# Patient Record
Sex: FEMALE | Race: BLACK OR AFRICAN AMERICAN | NOT HISPANIC OR LATINO | Employment: FULL TIME | URBAN - METROPOLITAN AREA
[De-identification: names, ages, dates, MRNs, and addresses within clinical notes are randomized per-mention and may not be internally consistent; named-entity substitution may affect disease eponyms.]

---

## 2023-10-24 ENCOUNTER — HOSPITAL ENCOUNTER (INPATIENT)
Facility: HOSPITAL | Age: 38
LOS: 2 days | Discharge: HOME | DRG: 638 | End: 2023-10-26
Attending: HOSPITALIST | Admitting: HOSPITALIST
Payer: COMMERCIAL

## 2023-10-24 DIAGNOSIS — E66.9 OBESITY, UNSPECIFIED CLASSIFICATION, UNSPECIFIED OBESITY TYPE, UNSPECIFIED WHETHER SERIOUS COMORBIDITY PRESENT: ICD-10-CM

## 2023-10-24 DIAGNOSIS — E11.40 TYPE 2 DIABETES MELLITUS WITH DIABETIC NEUROPATHY, WITHOUT LONG-TERM CURRENT USE OF INSULIN: ICD-10-CM

## 2023-10-24 DIAGNOSIS — R00.0 TACHYCARDIA: ICD-10-CM

## 2023-10-24 DIAGNOSIS — L03.119 CELLULITIS OF FOOT: ICD-10-CM

## 2023-10-24 DIAGNOSIS — N17.9 ACUTE RENAL FAILURE, UNSPECIFIED ACUTE RENAL FAILURE TYPE: ICD-10-CM

## 2023-10-24 DIAGNOSIS — I10 HYPERTENSION, UNSPECIFIED TYPE: ICD-10-CM

## 2023-10-24 DIAGNOSIS — Z79.1 NSAID LONG-TERM USE: ICD-10-CM

## 2023-10-24 DIAGNOSIS — L08.9 INFECTED BLISTER OF LEFT FOOT, INITIAL ENCOUNTER: ICD-10-CM

## 2023-10-24 DIAGNOSIS — E11.621 TYPE 2 DIABETES MELLITUS WITH FOOT ULCER, WITHOUT LONG-TERM CURRENT USE OF INSULIN: ICD-10-CM

## 2023-10-24 DIAGNOSIS — N17.9 AKI (ACUTE KIDNEY INJURY): Primary | ICD-10-CM

## 2023-10-24 DIAGNOSIS — L97.509 TYPE 2 DIABETES MELLITUS WITH FOOT ULCER, WITHOUT LONG-TERM CURRENT USE OF INSULIN: ICD-10-CM

## 2023-10-24 DIAGNOSIS — S90.822A INFECTED BLISTER OF LEFT FOOT, INITIAL ENCOUNTER: ICD-10-CM

## 2023-10-24 PROBLEM — E87.5 HYPERKALEMIA: Status: ACTIVE | Noted: 2023-10-24

## 2023-10-24 PROBLEM — D64.9 ANEMIA: Status: ACTIVE | Noted: 2023-10-24

## 2023-10-24 PROBLEM — E66.01 SEVERE OBESITY: Status: ACTIVE | Noted: 2023-10-24

## 2023-10-24 PROBLEM — L03.116 CELLULITIS OF LEFT FOOT: Status: ACTIVE | Noted: 2023-10-24

## 2023-10-24 LAB
ABO + RH BLD: NORMAL
ALBUMIN SERPL BCP-MCNC: 2.5 G/DL (ref 3.5–5.2)
ALP SERPL-CCNC: 114 U/L (ref 55–135)
ALT SERPL W/O P-5'-P-CCNC: 12 U/L (ref 10–44)
ANION GAP SERPL CALC-SCNC: 10 MMOL/L (ref 8–16)
ANION GAP SERPL CALC-SCNC: 10 MMOL/L (ref 8–16)
AST SERPL-CCNC: 12 U/L (ref 10–40)
BASOPHILS # BLD AUTO: 0.06 K/UL (ref 0–0.2)
BASOPHILS NFR BLD: 0.4 % (ref 0–1.9)
BILIRUB SERPL-MCNC: 0.2 MG/DL (ref 0.1–1)
BLD GP AB SCN CELLS X3 SERPL QL: NORMAL
BLD PROD TYP BPU: NORMAL
BLOOD UNIT EXPIRATION DATE: NORMAL
BLOOD UNIT TYPE CODE: 5100
BLOOD UNIT TYPE: NORMAL
BUN SERPL-MCNC: 53 MG/DL (ref 6–20)
BUN SERPL-MCNC: 54 MG/DL (ref 6–20)
CALCIUM SERPL-MCNC: 7.5 MG/DL (ref 8.7–10.5)
CALCIUM SERPL-MCNC: 7.6 MG/DL (ref 8.7–10.5)
CHLORIDE SERPL-SCNC: 113 MMOL/L (ref 95–110)
CHLORIDE SERPL-SCNC: 116 MMOL/L (ref 95–110)
CO2 SERPL-SCNC: 12 MMOL/L (ref 23–29)
CO2 SERPL-SCNC: 13 MMOL/L (ref 23–29)
CODING SYSTEM: NORMAL
CREAT SERPL-MCNC: 4.1 MG/DL (ref 0.5–1.4)
CREAT SERPL-MCNC: 4.2 MG/DL (ref 0.5–1.4)
CREAT UR-MCNC: 55.1 MG/DL (ref 15–325)
CROSSMATCH INTERPRETATION: NORMAL
CRP SERPL-MCNC: 103.7 MG/L (ref 0–8.2)
DIFFERENTIAL METHOD: ABNORMAL
DISPENSE STATUS: NORMAL
EOSINOPHIL # BLD AUTO: 0 K/UL (ref 0–0.5)
EOSINOPHIL NFR BLD: 0.3 % (ref 0–8)
ERYTHROCYTE [DISTWIDTH] IN BLOOD BY AUTOMATED COUNT: 13.3 % (ref 11.5–14.5)
ERYTHROCYTE [SEDIMENTATION RATE] IN BLOOD BY WESTERGREN METHOD: 142 MM/HR (ref 0–20)
EST. GFR  (NO RACE VARIABLE): 13 ML/MIN/1.73 M^2
EST. GFR  (NO RACE VARIABLE): 13 ML/MIN/1.73 M^2
FERRITIN SERPL-MCNC: 174 NG/ML (ref 20–300)
GLUCOSE SERPL-MCNC: 145 MG/DL (ref 70–110)
GLUCOSE SERPL-MCNC: 233 MG/DL (ref 70–110)
HCT VFR BLD AUTO: 21.4 % (ref 37–48.5)
HGB BLD-MCNC: 6.7 G/DL (ref 12–16)
IMM GRANULOCYTES # BLD AUTO: 0.1 K/UL (ref 0–0.04)
IMM GRANULOCYTES NFR BLD AUTO: 0.7 % (ref 0–0.5)
IRON SERPL-MCNC: 23 UG/DL (ref 30–160)
LYMPHOCYTES # BLD AUTO: 2 K/UL (ref 1–4.8)
LYMPHOCYTES NFR BLD: 13.5 % (ref 18–48)
MCH RBC QN AUTO: 27.2 PG (ref 27–31)
MCHC RBC AUTO-ENTMCNC: 31.3 G/DL (ref 32–36)
MCV RBC AUTO: 87 FL (ref 82–98)
MONOCYTES # BLD AUTO: 0.8 K/UL (ref 0.3–1)
MONOCYTES NFR BLD: 5.4 % (ref 4–15)
NEUTROPHILS # BLD AUTO: 12 K/UL (ref 1.8–7.7)
NEUTROPHILS NFR BLD: 79.7 % (ref 38–73)
NRBC BLD-RTO: 0 /100 WBC
NUM UNITS TRANS PACKED RBC: NORMAL
PLATELET # BLD AUTO: 258 K/UL (ref 150–450)
PMV BLD AUTO: 8.5 FL (ref 9.2–12.9)
POCT GLUCOSE: 132 MG/DL (ref 70–110)
POCT GLUCOSE: 213 MG/DL (ref 70–110)
POCT GLUCOSE: 243 MG/DL (ref 70–110)
POTASSIUM SERPL-SCNC: 4.7 MMOL/L (ref 3.5–5.1)
POTASSIUM SERPL-SCNC: 6.2 MMOL/L (ref 3.5–5.1)
PROT SERPL-MCNC: 7.2 G/DL (ref 6–8.4)
RBC # BLD AUTO: 2.46 M/UL (ref 4–5.4)
SATURATED IRON: 11 % (ref 20–50)
SODIUM SERPL-SCNC: 135 MMOL/L (ref 136–145)
SODIUM SERPL-SCNC: 139 MMOL/L (ref 136–145)
SODIUM UR-SCNC: 87 MMOL/L (ref 20–250)
SPECIMEN OUTDATE: NORMAL
TOTAL IRON BINDING CAPACITY: 218 UG/DL (ref 250–450)
TRANSFERRIN SERPL-MCNC: 147 MG/DL (ref 200–375)
VANCOMYCIN SERPL-MCNC: <1.1 UG/ML
WBC # BLD AUTO: 15.06 K/UL (ref 3.9–12.7)

## 2023-10-24 PROCEDURE — 99252 PR INITIAL INPATIENT CONSULT,LEVL II: ICD-10-PCS | Mod: ,,, | Performed by: PODIATRIST

## 2023-10-24 PROCEDURE — 82728 ASSAY OF FERRITIN: CPT | Performed by: FAMILY MEDICINE

## 2023-10-24 PROCEDURE — A4217 STERILE WATER/SALINE, 500 ML: HCPCS | Performed by: FAMILY MEDICINE

## 2023-10-24 PROCEDURE — 80053 COMPREHEN METABOLIC PANEL: CPT | Performed by: FAMILY MEDICINE

## 2023-10-24 PROCEDURE — 93005 ELECTROCARDIOGRAM TRACING: CPT

## 2023-10-24 PROCEDURE — 36415 COLL VENOUS BLD VENIPUNCTURE: CPT | Performed by: INTERNAL MEDICINE

## 2023-10-24 PROCEDURE — 11000001 HC ACUTE MED/SURG PRIVATE ROOM

## 2023-10-24 PROCEDURE — 36430 TRANSFUSION BLD/BLD COMPNT: CPT

## 2023-10-24 PROCEDURE — 86140 C-REACTIVE PROTEIN: CPT | Performed by: FAMILY MEDICINE

## 2023-10-24 PROCEDURE — 99255 PR INITIAL INPATIENT CONSULT,LEVL V: ICD-10-PCS | Mod: ,,, | Performed by: INTERNAL MEDICINE

## 2023-10-24 PROCEDURE — P9016 RBC LEUKOCYTES REDUCED: HCPCS | Performed by: FAMILY MEDICINE

## 2023-10-24 PROCEDURE — 83540 ASSAY OF IRON: CPT | Performed by: FAMILY MEDICINE

## 2023-10-24 PROCEDURE — 27000207 HC ISOLATION

## 2023-10-24 PROCEDURE — 84466 ASSAY OF TRANSFERRIN: CPT | Performed by: FAMILY MEDICINE

## 2023-10-24 PROCEDURE — 80048 BASIC METABOLIC PNL TOTAL CA: CPT | Mod: XB | Performed by: INTERNAL MEDICINE

## 2023-10-24 PROCEDURE — 80202 ASSAY OF VANCOMYCIN: CPT | Performed by: FAMILY MEDICINE

## 2023-10-24 PROCEDURE — 99255 IP/OBS CONSLTJ NEW/EST HI 80: CPT | Mod: ,,, | Performed by: INTERNAL MEDICINE

## 2023-10-24 PROCEDURE — 87070 CULTURE OTHR SPECIMN AEROBIC: CPT | Performed by: PODIATRIST

## 2023-10-24 PROCEDURE — 93010 ELECTROCARDIOGRAM REPORT: CPT | Mod: ,,, | Performed by: INTERNAL MEDICINE

## 2023-10-24 PROCEDURE — 84300 ASSAY OF URINE SODIUM: CPT | Performed by: FAMILY MEDICINE

## 2023-10-24 PROCEDURE — 87147 CULTURE TYPE IMMUNOLOGIC: CPT | Performed by: PODIATRIST

## 2023-10-24 PROCEDURE — 99252 IP/OBS CONSLTJ NEW/EST SF 35: CPT | Mod: ,,, | Performed by: PODIATRIST

## 2023-10-24 PROCEDURE — 86920 COMPATIBILITY TEST SPIN: CPT | Performed by: FAMILY MEDICINE

## 2023-10-24 PROCEDURE — 82570 ASSAY OF URINE CREATININE: CPT | Performed by: FAMILY MEDICINE

## 2023-10-24 PROCEDURE — 25000003 PHARM REV CODE 250: Performed by: FAMILY MEDICINE

## 2023-10-24 PROCEDURE — 85651 RBC SED RATE NONAUTOMATED: CPT | Performed by: FAMILY MEDICINE

## 2023-10-24 PROCEDURE — 63600175 PHARM REV CODE 636 W HCPCS: Performed by: FAMILY MEDICINE

## 2023-10-24 PROCEDURE — 86850 RBC ANTIBODY SCREEN: CPT | Performed by: FAMILY MEDICINE

## 2023-10-24 PROCEDURE — 85025 COMPLETE CBC W/AUTO DIFF WBC: CPT | Performed by: FAMILY MEDICINE

## 2023-10-24 PROCEDURE — 93010 EKG 12-LEAD: ICD-10-PCS | Mod: ,,, | Performed by: INTERNAL MEDICINE

## 2023-10-24 PROCEDURE — 36415 COLL VENOUS BLD VENIPUNCTURE: CPT | Performed by: FAMILY MEDICINE

## 2023-10-24 RX ORDER — HYDRALAZINE HYDROCHLORIDE 20 MG/ML
10 INJECTION INTRAMUSCULAR; INTRAVENOUS EVERY 6 HOURS PRN
Status: DISCONTINUED | OUTPATIENT
Start: 2023-10-24 | End: 2023-10-26 | Stop reason: HOSPADM

## 2023-10-24 RX ORDER — METOPROLOL TARTRATE 50 MG/1
50 TABLET ORAL 2 TIMES DAILY
Status: DISCONTINUED | OUTPATIENT
Start: 2023-10-24 | End: 2023-10-26 | Stop reason: HOSPADM

## 2023-10-24 RX ORDER — METRONIDAZOLE 500 MG/1
500 TABLET ORAL EVERY 8 HOURS
Status: DISCONTINUED | OUTPATIENT
Start: 2023-10-24 | End: 2023-10-25

## 2023-10-24 RX ORDER — SODIUM CHLORIDE 9 MG/ML
INJECTION, SOLUTION INTRAVENOUS CONTINUOUS
Status: DISCONTINUED | OUTPATIENT
Start: 2023-10-24 | End: 2023-10-24

## 2023-10-24 RX ORDER — AMLODIPINE BESYLATE 5 MG/1
5 TABLET ORAL DAILY
Status: DISCONTINUED | OUTPATIENT
Start: 2023-10-24 | End: 2023-10-26 | Stop reason: HOSPADM

## 2023-10-24 RX ORDER — METOPROLOL TARTRATE 1 MG/ML
5 INJECTION, SOLUTION INTRAVENOUS ONCE
Status: COMPLETED | OUTPATIENT
Start: 2023-10-24 | End: 2023-10-24

## 2023-10-24 RX ORDER — HYDROCODONE BITARTRATE AND ACETAMINOPHEN 7.5; 325 MG/1; MG/1
1 TABLET ORAL EVERY 6 HOURS PRN
Status: DISCONTINUED | OUTPATIENT
Start: 2023-10-24 | End: 2023-10-26 | Stop reason: HOSPADM

## 2023-10-24 RX ORDER — HYDROCODONE BITARTRATE AND ACETAMINOPHEN 500; 5 MG/1; MG/1
TABLET ORAL
Status: DISCONTINUED | OUTPATIENT
Start: 2023-10-24 | End: 2023-10-26 | Stop reason: HOSPADM

## 2023-10-24 RX ORDER — GLUCAGON 1 MG
1 KIT INJECTION
Status: DISCONTINUED | OUTPATIENT
Start: 2023-10-24 | End: 2023-10-26 | Stop reason: HOSPADM

## 2023-10-24 RX ORDER — INSULIN ASPART 100 [IU]/ML
0-5 INJECTION, SOLUTION INTRAVENOUS; SUBCUTANEOUS EVERY 6 HOURS PRN
Status: DISCONTINUED | OUTPATIENT
Start: 2023-10-24 | End: 2023-10-26 | Stop reason: HOSPADM

## 2023-10-24 RX ADMIN — AMLODIPINE BESYLATE 5 MG: 5 TABLET ORAL at 01:10

## 2023-10-24 RX ADMIN — INSULIN ASPART 2 UNITS: 100 INJECTION, SOLUTION INTRAVENOUS; SUBCUTANEOUS at 04:10

## 2023-10-24 RX ADMIN — SODIUM BICARBONATE: 84 INJECTION, SOLUTION INTRAVENOUS at 06:10

## 2023-10-24 RX ADMIN — SODIUM ZIRCONIUM CYCLOSILICATE 10 G: 5 POWDER, FOR SUSPENSION ORAL at 09:10

## 2023-10-24 RX ADMIN — METRONIDAZOLE 500 MG: 500 TABLET ORAL at 01:10

## 2023-10-24 RX ADMIN — INSULIN ASPART 1 UNITS: 100 INJECTION, SOLUTION INTRAVENOUS; SUBCUTANEOUS at 11:10

## 2023-10-24 RX ADMIN — METOPROLOL TARTRATE 50 MG: 50 TABLET, FILM COATED ORAL at 09:10

## 2023-10-24 RX ADMIN — SODIUM CHLORIDE: 9 INJECTION, SOLUTION INTRAVENOUS at 09:10

## 2023-10-24 RX ADMIN — CEFEPIME 2 G: 2 INJECTION, POWDER, FOR SOLUTION INTRAVENOUS at 09:10

## 2023-10-24 RX ADMIN — METRONIDAZOLE 500 MG: 500 TABLET ORAL at 09:10

## 2023-10-24 RX ADMIN — METOROPROLOL TARTRATE 5 MG: 5 INJECTION, SOLUTION INTRAVENOUS at 05:10

## 2023-10-24 RX ADMIN — HYDRALAZINE HYDROCHLORIDE 10 MG: 20 INJECTION, SOLUTION INTRAMUSCULAR; INTRAVENOUS at 01:10

## 2023-10-24 RX ADMIN — VANCOMYCIN HYDROCHLORIDE 1750 MG: 10 INJECTION, POWDER, LYOPHILIZED, FOR SOLUTION INTRAVENOUS at 12:10

## 2023-10-24 NOTE — ASSESSMENT & PLAN NOTE
Complicates overall care.  Discussed the importance of appropriate diabetic management and glycemic control.

## 2023-10-24 NOTE — ASSESSMENT & PLAN NOTE
Continue IV antibiotics for the left lower extremity.  Per Hospital Medicine  MRI to assess potential underlying abscess.    Culture taken of blister

## 2023-10-24 NOTE — ASSESSMENT & PLAN NOTE
Will cont vanc, cefepime, and flagyl  Podiatry consulted  Mri ordered   Npo at midnight for possible surgery tomorrow

## 2023-10-24 NOTE — CONSULTS
[facility]  Podiatry  Consult Note    Patient Name: Ian Albrecht  MRN: [id number]  Admission Date: 10/24/2023  Hospital Length of Stay: 0 days  Attending Physician: Tenzin Grayson MD  Primary Care Provider: No primary care provider on file.     Inpatient consult to Podiatry  Consult performed by: Rozina Welch DPM  Consult ordered by: Tenzin Grayson MD  Reason for consult: Cellulitis of left foot with infected blister.  Assessment/Recommendations: MRI to further evaluate for underlying abscess formation.  Culture taken blister.  Follow cultures and sensitivities.        Subjective:     History of Present Illness:  38 year old with PMHx of Diabetes Mellitus Type , hypertension, and hyperlipidemia.  Presented to the emergency department at [facility] same the with worsening cellulitis, increased pain, and swelling to the left foot for the last 3 days.  Operative shoe lower she is developing a callus to the 5th toe to ultimately resulted in a blister formation that began to spread to the dorsal lateral aspect of the left foot. Blister formation 5 cm x 4cm present to the dorsal lateral left foot and left fifth digit.  The greatest the provider in the emergency department at the outside facility was concerned for potential gangrenous changes but did note that patient did have a pulse in the foot      Scheduled Meds:   ceFEPime (MAXIPIME) IVPB  2 g Intravenous Q24H    metroNIDAZOLE  500 mg Oral Q8H     Continuous Infusions:   sodium chloride 0.9% 75 mL/hr at 10/24/23 0901     PRN Meds:0.9%  NaCl infusion (for blood administration), glucagon (human recombinant), hydrALAZINE, insulin aspart U-100, Pharmacy to dose Vancomycin consult **AND** vancomycin - pharmacy to dose    Review of patient's allergies indicates:  No Known Allergies     No past medical history on file.  No past surgical history on file.    Family History    None       Tobacco Use    Smoking status: Not on file    Smokeless  tobacco: Not on file   Substance and Sexual Activity    Alcohol use: Not on file    Drug use: Not on file    Sexual activity: Not on file     Review of Systems   Constitutional:  Positive for activity change. Negative for appetite change, chills and fever.   HENT:  Positive for nosebleeds. Negative for facial swelling and voice change.    Eyes:  Negative for visual disturbance.   Respiratory:  Negative for cough, chest tightness and shortness of breath.    Cardiovascular:  Negative for chest pain and leg swelling.   Gastrointestinal:  Positive for nausea. Negative for abdominal pain, diarrhea and vomiting.   Genitourinary:  Negative for difficulty urinating and frequency.   Musculoskeletal:  Positive for gait problem. Negative for arthralgias, back pain and joint swelling.   Skin:  Positive for color change and wound. Negative for pallor and rash.   Allergic/Immunologic: Negative for immunocompromised state.   Neurological:  Negative for weakness and numbness.   Psychiatric/Behavioral:  Negative for confusion. The patient is not nervous/anxious.      Objective:     Vital Signs (Most Recent):  Temp: 97.8 °F (36.6 °C) (10/24/23 0710)  Pulse: 102 (10/24/23 0710)  Resp: 17 (10/24/23 0710)  BP: (!) 147/86 (10/24/23 0710)  SpO2: 100 % (10/24/23 0710) Vital Signs (24h Range):  Temp:  [97.8 °F (36.6 °C)] 97.8 °F (36.6 °C)  Pulse:  [102] 102  Resp:  [17] 17  SpO2:  [100 %] 100 %  BP: (147)/(86) 147/86     Weight: 89.8 kg (198 lb)  Body mass index is 36.21 kg/m².    Foot Exam    CONSTITUTIONAL:  Patient is awake, alert, and oriented to person, place, and time.  Patient is well groomed with normal appearance.  No activity change.  No appetite change.    EYES:  Pupils are equal and reactive to light.  No icterus    ENT:  Mucous membranes are moist.  Dentition intact.    PULMONARY:  Breathing is nonlabored.  No wheezing or rales    VASCULAR:  Dorsalis pedis pulse on the right 2/4, on the left 2/4.  Posterior tibial pulse on  "the right 2/4, on the left 2/4.  Capillary refill intact less than 3 sec of bilateral lower extremities.  Pedal hair growth is absent to the dorsal aspect of the foot and digits bilaterally.  Moderate edema to lower extremities.    NEUROLOGICAL:  Sensation light touch is intact.  Sensation to pinprick is intact.  Protective sensation is reduced with 5.07 g [place]-Gutierrez monofilament    DERMATOLOGICAL:  blister formation to the dorsal aspect the left forefoot encompassing the dorsal aspect of the 5th digit.  The area does measure approximately 5 cm x 4 cm with sloughing tissue.  Cellulitis to the foot.  There is moderate edema present.  There is no visualized open wound progressing to the subcutaneous tissue.  No bone or tendinous exposure    MUSCULOSKELETAL: No history of amputations. Intrinsic and extrinsic musculature intact to the lower extremity.  5/5 muscle strength testing.    Laboratory:  A1C: No results for input(s): "HGBA1C" in the last 4320 hours.  CBC:   Recent Labs   Lab 10/24/23  0834   WBC 15.06*   RBC 2.46*   HGB 6.7*   HCT 21.4*      MCV 87   MCH 27.2   MCHC 31.3*     CMP:   Recent Labs   Lab 10/24/23  0834   *   CALCIUM 7.5*   ALBUMIN 2.5*   PROT 7.2      K 6.2*   CO2 13*   *   BUN 54*   CREATININE 4.2*   ALKPHOS 114   ALT 12   AST 12   BILITOT 0.2     CPS: {:LNK,LABRCNTIP[  CRP:   Recent Labs   Lab 10/24/23  0834   .7*     ESR:   Recent Labs   Lab 10/24/23  0834   SEDRATE 142*     Wound Cultures: No results for input(s): "LABAERO" in the last 4320 hours.  All pertinent labs reviewed within the last 24 hours.    Diagnostic Results:  I have reviewed all pertinent imaging results/findings within the past 24 hours.    Clinical Findings:  Cellulitis with superficial blister to the left lateral foot.    Assessment/Plan:     Renal/  JOSE (acute kidney injury)  GFR 13.  IV fluids her Hospital Medicine.      ID  Cellulitis of left foot  Continue IV antibiotics for the " left lower extremity.  Per Hospital Medicine  MRI to assess potential underlying abscess.    Culture taken of blister    Endocrine  Type 2 diabetes mellitus with diabetic neuropathy, without long-term current use of insulin  Complicates overall care.  Discussed the importance of appropriate diabetic management and glycemic control.    Orthopedic  Infected blister of left foot  Culture taken of the blister to the left foot.  Will await cultures and sensitivities.  MRI to determine if underlying abscess is noted.  If positive, would recommend surgical incision and drainage.  If negative, patient would likely tolerate local wound care with home health on discharge        Thank you for your consult.     Rozina Welch DPM  Podiatry  [facility] - Med Surg

## 2023-10-24 NOTE — HPI
38 year old with PMHx of Diabetes Mellitus Type , hypertension, and hyperlipidemia.  Presented to the emergency department at [facility] same the with worsening cellulitis, increased pain, and swelling to the left foot for the last 3 days.  Operative shoe lower she is developing a callus to the 5th toe to ultimately resulted in a blister formation that began to spread to the dorsal lateral aspect of the left foot. Blister formation 5 cm x 4cm present to the dorsal lateral left foot and left fifth digit.  The greatest the provider in the emergency department at the outside facility was concerned for potential gangrenous changes but did note that patient did have a pulse in the foot

## 2023-10-24 NOTE — ASSESSMENT & PLAN NOTE
Culture taken of the blister to the left foot.  Will await cultures and sensitivities.  MRI to determine if underlying abscess is noted.  If positive, would recommend surgical incision and drainage.  If negative, patient would likely tolerate local wound care with home health on discharge

## 2023-10-24 NOTE — HOSPITAL COURSE
Patient see in her room NPO with cellulitis of the left foot. WBC 15.06, Hgb 6.7, Sed 142, K+ 6.2, .7, Creatinine 4.2, GFR 13.     Xray negative for subcutaneous gas, No fracture or dislocation.     Currently admitted to hospital medicine on IV antibiotics.     10/25/2023:    WBC improving to 11.4, K +improved to 4.7, GFR stable 13    Culture of the left foot positive for Streptococcus agalactiae- preliminary    MRI completed on 10/25/2023.  No evidence of drainable abscess or fluid collection.  Cellulitis with edema present to the left foot.  No evidence of underlying bony/osseous involvement.

## 2023-10-24 NOTE — PLAN OF CARE
Pt Awake, Alert, and oriented to Person, Place, Time  , remains free from falls/injuries this shift. Safety precautions maintained. Pt ST on heart monitor 8630, MD notified that pt heart rate ranged between 125-130s. Orders placed. Pt tolerating blood transfusion. Pain managed with pain medication and rest. No s/s of acute distress noted. Continue with plan of care. Chart check complete.       Problem: Adult Inpatient Plan of Care  Goal: Plan of Care Review  Outcome: Ongoing, Progressing  Goal: Patient-Specific Goal (Individualized)  Outcome: Ongoing, Progressing  Goal: Absence of Hospital-Acquired Illness or Injury  Outcome: Ongoing, Progressing  Goal: Optimal Comfort and Wellbeing  Outcome: Ongoing, Progressing  Goal: Readiness for Transition of Care  Outcome: Ongoing, Progressing

## 2023-10-24 NOTE — ASSESSMENT & PLAN NOTE
Anemia noted  Pt reports menorrhagia which has stopped  Will transfuse 1 unit prbc   Iron studies

## 2023-10-24 NOTE — PROVIDER TRANSFER
Outside Transfer Acceptance Note / Regional Referral Center    Referring facility: [facility]   Referring provider: FAVIOLA MILLER  Accepting facility: [facility]  Accepting provider: WELLINGTON HOOVER  Admitting provider: CHARLY VERGARA  Reason for transfer: Higher Level of Care   Transfer diagnosis: cellulitis of foot w/ abscess, ARF  Transfer specialty requested: Nephrology  Transfer specialty notified: Yes  Transfer level: NUMBER 1-5: 2  Bed type requested: foot abscess and cellulitis  Isolation status: No active isolations   Admission class or status: Emergency      Narrative      Ms. Lopez is a 39yo lady with a past medical history of DM2, HTN and LHD.  She now presents to [facility] with 3 days of worsening pain, redness and swelling to her left foot.  Her issues began as a blister to her 5th toe, which then spread to half of the foot.  There is a blister there 10x8 mm.  The area appears to have a gangrenous appearance to the sending MD.  He notes decreased, but present pulses in the foot.    Latest vitals: 98.8 temp, 121 HR (was in 140s on arrival), 136/77 (was 213/133 on arrival),99% on RA    Labs showed:  WBC 15, Hg 7.8, Cr 4.4, BUN 54, Gluc 285, trop 23, NML LFT, no ESR or CRP    Xray: no fracture/no dislocation/no subq gas    In the ED she was treated with Vancomycin, Zosyn and Clindamyin    Podiatry was notified and is willing to assist in the patient's care      Objective      Recent Labs: All pertinent labs within the past 24 hours have been reviewed.      IV access:  PIV  Infusions: none  Allergies: Review of patient's allergies indicates:  Not on File   NPO: No    Anticoagulation:   Anticoagulants       None             Instructions      [facility]  Admit to Hospital Medicine  Upon patient arrival to floor, please contact Hospital Medicine on call.

## 2023-10-24 NOTE — SUBJECTIVE & OBJECTIVE
Scheduled Meds:   ceFEPime (MAXIPIME) IVPB  2 g Intravenous Q24H    metroNIDAZOLE  500 mg Oral Q8H     Continuous Infusions:   sodium chloride 0.9% 75 mL/hr at 10/24/23 0901     PRN Meds:0.9%  NaCl infusion (for blood administration), glucagon (human recombinant), hydrALAZINE, insulin aspart U-100, Pharmacy to dose Vancomycin consult **AND** vancomycin - pharmacy to dose    Review of patient's allergies indicates:  No Known Allergies     No past medical history on file.  No past surgical history on file.    Family History    None       Tobacco Use    Smoking status: Not on file    Smokeless tobacco: Not on file   Substance and Sexual Activity    Alcohol use: Not on file    Drug use: Not on file    Sexual activity: Not on file     Review of Systems   Constitutional:  Positive for activity change. Negative for appetite change, chills and fever.   HENT:  Positive for nosebleeds. Negative for facial swelling and voice change.    Eyes:  Negative for visual disturbance.   Respiratory:  Negative for cough, chest tightness and shortness of breath.    Cardiovascular:  Negative for chest pain and leg swelling.   Gastrointestinal:  Positive for nausea. Negative for abdominal pain, diarrhea and vomiting.   Genitourinary:  Negative for difficulty urinating and frequency.   Musculoskeletal:  Positive for gait problem. Negative for arthralgias, back pain and joint swelling.   Skin:  Positive for color change and wound. Negative for pallor and rash.   Allergic/Immunologic: Negative for immunocompromised state.   Neurological:  Negative for weakness and numbness.   Psychiatric/Behavioral:  Negative for confusion. The patient is not nervous/anxious.      Objective:     Vital Signs (Most Recent):  Temp: 97.8 °F (36.6 °C) (10/24/23 0710)  Pulse: 102 (10/24/23 0710)  Resp: 17 (10/24/23 0710)  BP: (!) 147/86 (10/24/23 0710)  SpO2: 100 % (10/24/23 0710) Vital Signs (24h Range):  Temp:  [97.8 °F (36.6 °C)] 97.8 °F (36.6 °C)  Pulse:  [102]  "102  Resp:  [17] 17  SpO2:  [100 %] 100 %  BP: (147)/(86) 147/86     Weight: 89.8 kg (198 lb)  Body mass index is 36.21 kg/m².    Foot Exam    CONSTITUTIONAL:  Patient is awake, alert, and oriented to person, place, and time.  Patient is well groomed with normal appearance.  No activity change.  No appetite change.    EYES:  Pupils are equal and reactive to light.  No icterus    ENT:  Mucous membranes are moist.  Dentition intact.    PULMONARY:  Breathing is nonlabored.  No wheezing or rales    VASCULAR:  Dorsalis pedis pulse on the right 2/4, on the left 2/4.  Posterior tibial pulse on the right 2/4, on the left 2/4.  Capillary refill intact less than 3 sec of bilateral lower extremities.  Pedal hair growth is absent to the dorsal aspect of the foot and digits bilaterally.  Moderate edema to lower extremities.    NEUROLOGICAL:  Sensation light touch is intact.  Sensation to pinprick is intact.  Protective sensation is reduced with 5.07 g [place]-Gutierrez monofilament    DERMATOLOGICAL:  blister formation to the dorsal aspect the left forefoot encompassing the dorsal aspect of the 5th digit.  The area does measure approximately 5 cm x 4 cm with sloughing tissue.  Cellulitis to the foot.  There is moderate edema present.  There is no visualized open wound progressing to the subcutaneous tissue.  No bone or tendinous exposure    MUSCULOSKELETAL: No history of amputations. Intrinsic and extrinsic musculature intact to the lower extremity.  5/5 muscle strength testing.    Laboratory:  A1C: No results for input(s): "HGBA1C" in the last 4320 hours.  CBC:   Recent Labs   Lab 10/24/23  0834   WBC 15.06*   RBC 2.46*   HGB 6.7*   HCT 21.4*      MCV 87   MCH 27.2   MCHC 31.3*     CMP:   Recent Labs   Lab 10/24/23  0834   *   CALCIUM 7.5*   ALBUMIN 2.5*   PROT 7.2      K 6.2*   CO2 13*   *   BUN 54*   CREATININE 4.2*   ALKPHOS 114   ALT 12   AST 12   BILITOT 0.2     CPS: {:LNK,LABRCNTIP[  CRP:   Recent " "Labs   Lab 10/24/23  0834   .7*     ESR:   Recent Labs   Lab 10/24/23  0834   SEDRATE 142*     Wound Cultures: No results for input(s): "LABAERO" in the last 4320 hours.  All pertinent labs reviewed within the last 24 hours.    Diagnostic Results:  I have reviewed all pertinent imaging results/findings within the past 24 hours.    Clinical Findings:  Cellulitis with superficial blister to the left lateral foot.  "

## 2023-10-24 NOTE — HPI
Patient is a 38 year old aa female with a PMH of DM, hypertension, and hyperlipidemia who presented to [facility] for wound on left foot. She reports having a blister formed on her foot a few days ago which progressively worsened. She reports pain however denies any fever. Does report drainage from wound. Xray performed at outlying facility negative for subcutaneous gas however there was high concern for gangrene. Patient was transferred to [facility] for podiatry services.

## 2023-10-24 NOTE — SUBJECTIVE & OBJECTIVE
No past medical history on file.    No past surgical history on file.    Review of patient's allergies indicates:  No Known Allergies    No current facility-administered medications on file prior to encounter.     No current outpatient medications on file prior to encounter.     Family History    None       Tobacco Use    Smoking status: Not on file    Smokeless tobacco: Not on file   Substance and Sexual Activity    Alcohol use: Not on file    Drug use: Not on file    Sexual activity: Not on file     Review of Systems   Constitutional:  Positive for fatigue. Negative for fever.   HENT:  Negative for sinus pressure.    Eyes:  Negative for visual disturbance.   Respiratory:  Negative for shortness of breath.    Cardiovascular:  Negative for chest pain.   Gastrointestinal:  Negative for nausea and vomiting.   Genitourinary:  Negative for difficulty urinating.   Musculoskeletal:  Negative for back pain.   Skin:  Positive for wound (left foot). Negative for rash.   Neurological:  Negative for headaches.   Psychiatric/Behavioral:  Negative for confusion.      Objective:     Vital Signs (Most Recent):  Temp: 97.8 °F (36.6 °C) (10/24/23 0710)  Pulse: 102 (10/24/23 0710)  Resp: 17 (10/24/23 0710)  BP: (!) 147/86 (10/24/23 0710)  SpO2: 100 % (10/24/23 0710) Vital Signs (24h Range):  Temp:  [97.8 °F (36.6 °C)] 97.8 °F (36.6 °C)  Pulse:  [102] 102  Resp:  [17] 17  SpO2:  [100 %] 100 %  BP: (147)/(86) 147/86     Weight: 89.8 kg (198 lb)  Body mass index is 36.21 kg/m².     Physical Exam  Constitutional:       General: She is not in acute distress.     Appearance: She is well-developed. She is not diaphoretic.   HENT:      Head: Normocephalic and atraumatic.   Eyes:      Pupils: Pupils are equal, round, and reactive to light.   Cardiovascular:      Rate and Rhythm: Normal rate and regular rhythm.      Heart sounds: Normal heart sounds. No murmur heard.     No friction rub. No gallop.   Pulmonary:      Effort: Pulmonary effort is  normal. No respiratory distress.      Breath sounds: Normal breath sounds. No stridor. No wheezing or rales.   Abdominal:      General: Bowel sounds are normal. There is no distension.      Palpations: Abdomen is soft. There is no mass.      Tenderness: There is no abdominal tenderness. There is no guarding.   Skin:     General: Skin is warm.      Findings: No erythema.      Comments: Blister noted on lateral left foot   Neurological:      Mental Status: She is alert and oriented to person, place, and time.              CRANIAL NERVES     CN III, IV, VI   Pupils are equal, round, and reactive to light.       Significant Labs:   Results for orders placed or performed during the hospital encounter of 10/24/23   CBC Auto Differential   Result Value Ref Range    WBC 15.06 (H) 3.90 - 12.70 K/uL    RBC 2.46 (L) 4.00 - 5.40 M/uL    Hemoglobin 6.7 (L) 12.0 - 16.0 g/dL    Hematocrit 21.4 (L) 37.0 - 48.5 %    MCV 87 82 - 98 fL    MCH 27.2 27.0 - 31.0 pg    MCHC 31.3 (L) 32.0 - 36.0 g/dL    RDW 13.3 11.5 - 14.5 %    Platelets 258 150 - 450 K/uL    MPV 8.5 (L) 9.2 - 12.9 fL    Immature Granulocytes 0.7 (H) 0.0 - 0.5 %    Gran # (ANC) 12.0 (H) 1.8 - 7.7 K/uL    Immature Grans (Abs) 0.10 (H) 0.00 - 0.04 K/uL    Lymph # 2.0 1.0 - 4.8 K/uL    Mono # 0.8 0.3 - 1.0 K/uL    Eos # 0.0 0.0 - 0.5 K/uL    Baso # 0.06 0.00 - 0.20 K/uL    nRBC 0 0 /100 WBC    Gran % 79.7 (H) 38.0 - 73.0 %    Lymph % 13.5 (L) 18.0 - 48.0 %    Mono % 5.4 4.0 - 15.0 %    Eosinophil % 0.3 0.0 - 8.0 %    Basophil % 0.4 0.0 - 1.9 %    Differential Method Automated    Comprehensive metabolic panel   Result Value Ref Range    Sodium 139 136 - 145 mmol/L    Potassium 6.2 (H) 3.5 - 5.1 mmol/L    Chloride 116 (H) 95 - 110 mmol/L    CO2 13 (L) 23 - 29 mmol/L    Glucose 145 (H) 70 - 110 mg/dL    BUN 54 (H) 6 - 20 mg/dL    Creatinine 4.2 (H) 0.5 - 1.4 mg/dL    Calcium 7.5 (L) 8.7 - 10.5 mg/dL    Total Protein 7.2 6.0 - 8.4 g/dL    Albumin 2.5 (L) 3.5 - 5.2 g/dL    Total  Bilirubin 0.2 0.1 - 1.0 mg/dL    Alkaline Phosphatase 114 55 - 135 U/L    AST 12 10 - 40 U/L    ALT 12 10 - 44 U/L    eGFR 13 (A) >60 mL/min/1.73 m^2    Anion Gap 10 8 - 16 mmol/L   Sedimentation rate   Result Value Ref Range    Sed Rate 142 (H) 0 - 20 mm/Hr   C-reactive protein   Result Value Ref Range    .7 (H) 0.0 - 8.2 mg/L   Ferritin   Result Value Ref Range    Ferritin 174 20.0 - 300.0 ng/mL   Vancomycin, random   Result Value Ref Range    Vancomycin, Random <1.1 Not established ug/mL   Type & Screen   Result Value Ref Range    Group & Rh O POS     Indirect Jorge NEG     Specimen Outdate 10/27/2023 23:59    POCT glucose   Result Value Ref Range    POCT Glucose 132 (H) 70 - 110 mg/dL        Significant Imaging: I have reviewed all pertinent imaging results/findings within the past 24 hours.

## 2023-10-24 NOTE — H&P
[facility]  History & Physical    Patient Name: Ian Albrecht  MRN: [id number]  Patient Class: IP- Inpatient  Admission Date: 10/24/2023  Attending Physician: Tenzin Grayson MD  Primary Care Provider: No primary care provider on file.         Patient information was obtained from patient and ER records.     Subjective:     Principal Problem:Infected blister of left foot    Chief Complaint:   Chief Complaint   Patient presents with    left foot wound        HPI: Patient is a 38 year old aa female with a PMH of DM, hypertension, and hyperlipidemia who presented to [facility] for wound on left foot. She reports having a blister formed on her foot a few days ago which progressively worsened. She reports pain however denies any fever. Does report drainage from wound. Xray performed at outlying facility negative for subcutaneous gas however there was high concern for gangrene. Patient was transferred to [facility] for podiatry services.           No past medical history on file.    No past surgical history on file.    Review of patient's allergies indicates:  No Known Allergies    No current facility-administered medications on file prior to encounter.     No current outpatient medications on file prior to encounter.     Family History    None       Tobacco Use    Smoking status: Not on file    Smokeless tobacco: Not on file   Substance and Sexual Activity    Alcohol use: Not on file    Drug use: Not on file    Sexual activity: Not on file     Review of Systems   Constitutional:  Positive for fatigue. Negative for fever.   HENT:  Negative for sinus pressure.    Eyes:  Negative for visual disturbance.   Respiratory:  Negative for shortness of breath.    Cardiovascular:  Negative for chest pain.   Gastrointestinal:  Negative for nausea and vomiting.   Genitourinary:  Negative for difficulty urinating.   Musculoskeletal:  Negative for back pain.   Skin:  Positive for wound  (left foot). Negative for rash.   Neurological:  Negative for headaches.   Psychiatric/Behavioral:  Negative for confusion.      Objective:     Vital Signs (Most Recent):  Temp: 97.8 °F (36.6 °C) (10/24/23 0710)  Pulse: 102 (10/24/23 0710)  Resp: 17 (10/24/23 0710)  BP: (!) 147/86 (10/24/23 0710)  SpO2: 100 % (10/24/23 0710) Vital Signs (24h Range):  Temp:  [97.8 °F (36.6 °C)] 97.8 °F (36.6 °C)  Pulse:  [102] 102  Resp:  [17] 17  SpO2:  [100 %] 100 %  BP: (147)/(86) 147/86     Weight: 89.8 kg (198 lb)  Body mass index is 36.21 kg/m².     Physical Exam  Constitutional:       General: She is not in acute distress.     Appearance: She is well-developed. She is not diaphoretic.   HENT:      Head: Normocephalic and atraumatic.   Eyes:      Pupils: Pupils are equal, round, and reactive to light.   Cardiovascular:      Rate and Rhythm: Normal rate and regular rhythm.      Heart sounds: Normal heart sounds. No murmur heard.     No friction rub. No gallop.   Pulmonary:      Effort: Pulmonary effort is normal. No respiratory distress.      Breath sounds: Normal breath sounds. No stridor. No wheezing or rales.   Abdominal:      General: Bowel sounds are normal. There is no distension.      Palpations: Abdomen is soft. There is no mass.      Tenderness: There is no abdominal tenderness. There is no guarding.   Skin:     General: Skin is warm.      Findings: No erythema.      Comments: Blister noted on lateral left foot   Neurological:      Mental Status: She is alert and oriented to person, place, and time.              CRANIAL NERVES     CN III, IV, VI   Pupils are equal, round, and reactive to light.       Significant Labs:   Results for orders placed or performed during the hospital encounter of 10/24/23   CBC Auto Differential   Result Value Ref Range    WBC 15.06 (H) 3.90 - 12.70 K/uL    RBC 2.46 (L) 4.00 - 5.40 M/uL    Hemoglobin 6.7 (L) 12.0 - 16.0 g/dL    Hematocrit 21.4 (L) 37.0 - 48.5 %    MCV 87 82 - 98 fL    MCH  27.2 27.0 - 31.0 pg    MCHC 31.3 (L) 32.0 - 36.0 g/dL    RDW 13.3 11.5 - 14.5 %    Platelets 258 150 - 450 K/uL    MPV 8.5 (L) 9.2 - 12.9 fL    Immature Granulocytes 0.7 (H) 0.0 - 0.5 %    Gran # (ANC) 12.0 (H) 1.8 - 7.7 K/uL    Immature Grans (Abs) 0.10 (H) 0.00 - 0.04 K/uL    Lymph # 2.0 1.0 - 4.8 K/uL    Mono # 0.8 0.3 - 1.0 K/uL    Eos # 0.0 0.0 - 0.5 K/uL    Baso # 0.06 0.00 - 0.20 K/uL    nRBC 0 0 /100 WBC    Gran % 79.7 (H) 38.0 - 73.0 %    Lymph % 13.5 (L) 18.0 - 48.0 %    Mono % 5.4 4.0 - 15.0 %    Eosinophil % 0.3 0.0 - 8.0 %    Basophil % 0.4 0.0 - 1.9 %    Differential Method Automated    Comprehensive metabolic panel   Result Value Ref Range    Sodium 139 136 - 145 mmol/L    Potassium 6.2 (H) 3.5 - 5.1 mmol/L    Chloride 116 (H) 95 - 110 mmol/L    CO2 13 (L) 23 - 29 mmol/L    Glucose 145 (H) 70 - 110 mg/dL    BUN 54 (H) 6 - 20 mg/dL    Creatinine 4.2 (H) 0.5 - 1.4 mg/dL    Calcium 7.5 (L) 8.7 - 10.5 mg/dL    Total Protein 7.2 6.0 - 8.4 g/dL    Albumin 2.5 (L) 3.5 - 5.2 g/dL    Total Bilirubin 0.2 0.1 - 1.0 mg/dL    Alkaline Phosphatase 114 55 - 135 U/L    AST 12 10 - 40 U/L    ALT 12 10 - 44 U/L    eGFR 13 (A) >60 mL/min/1.73 m^2    Anion Gap 10 8 - 16 mmol/L   Sedimentation rate   Result Value Ref Range    Sed Rate 142 (H) 0 - 20 mm/Hr   C-reactive protein   Result Value Ref Range    .7 (H) 0.0 - 8.2 mg/L   Ferritin   Result Value Ref Range    Ferritin 174 20.0 - 300.0 ng/mL   Vancomycin, random   Result Value Ref Range    Vancomycin, Random <1.1 Not established ug/mL   Type & Screen   Result Value Ref Range    Group & Rh O POS     Indirect Jorge NEG     Specimen Outdate 10/27/2023 23:59    POCT glucose   Result Value Ref Range    POCT Glucose 132 (H) 70 - 110 mg/dL        Significant Imaging: I have reviewed all pertinent imaging results/findings within the past 24 hours.    Assessment/Plan:     * Infected blister of left foot  Will cont vanc, cefepime, and flagyl  Podiatry consulted  Mri  ordered   Npo at midnight for possible surgery tomorrow      Hyperkalemia  lokelma  IVF  Repeat bmp later today       Anemia  Anemia noted  Pt reports menorrhagia which has stopped  Will transfuse 1 unit prbc   Iron studies       JOSE (acute kidney injury)  Uncertain on baseline creatinine  Check urine lytes  Consult nephrology on case  IVF     Type 2 diabetes mellitus with diabetic neuropathy, without long-term current use of insulin  Sliding scale  Ada diet       VTE Risk Mitigation (From admission, onward)    None                         Tenzin Grayson MD  Department of Hospital Medicine  [facility]

## 2023-10-24 NOTE — PROGRESS NOTES
Pharmacokinetic Initial Assessment: IV Vancomycin    Assessment/Plan:    Initiate intravenous vancomycin with loading dose of 1750 mg once with subsequent doses when random concentrations are less than 20 mcg/mL  Desired empiric serum trough concentration is 10 to 20 mcg/mL  Draw vancomycin random level on 10/25 at 1100.  Pharmacy will continue to follow and monitor vancomycin.      Please contact pharmacy at extension [phone] with any questions regarding this assessment.     Thank you for the consult,   Donna Timmons [facility]       Patient brief summary:  Ian Albrecht is a 38 y.o. female initiated on antimicrobial therapy with IV Vancomycin for treatment of suspected skin & soft tissue infection with high concern for gangrene    Drug Allergies:   Review of patient's allergies indicates:  No Known Allergies    Actual Body Weight:   89.8 kg    Renal Function:   Estimated Creatinine Clearance: 18.6 mL/min (A) (based on SCr of 4.2 mg/dL (H)).,     Dialysis Method (if applicable):  N/A-JOSE uncertain on baseline/nephrology consulted    CBC (last 72 hours):  Recent Labs   Lab Result Units 10/24/23  0834   WBC K/uL 15.06*   Hemoglobin g/dL 6.7*   Hematocrit % 21.4*   Platelets K/uL 258   Gran % % 79.7*   Lymph % % 13.5*   Mono % % 5.4   Eosinophil % % 0.3   Basophil % % 0.4   Differential Method  Automated       Metabolic Panel (last 72 hours):  Recent Labs   Lab Result Units 10/24/23  0834   Sodium mmol/L 139   Potassium mmol/L 6.2*   Chloride mmol/L 116*   CO2 mmol/L 13*   Glucose mg/dL 145*   BUN mg/dL 54*   Creatinine mg/dL 4.2*   Albumin g/dL 2.5*   Total Bilirubin mg/dL 0.2   Alkaline Phosphatase U/L 114   AST U/L 12   ALT U/L 12       Drug levels (last 3 results):  Recent Labs   Lab Result Units 10/24/23  0957   Vancomycin, Random ug/mL <1.1       Microbiologic Results:  Microbiology Results (last 7 days)       Procedure Component Value Units Date/Time    Aerobic culture [[id number]] Collected: 10/24/23  1025    Order Status: Sent Specimen: Wound from Foot, Left Updated: 10/24/23 1038

## 2023-10-24 NOTE — PLAN OF CARE
[facility]  Initial Discharge Assessment       Primary Care Provider: Maria Guadalupe Bui    Admission Diagnosis: Cellulitis of foot [L03.119]  Cellulitis of foot [L03.119]    Admission Date: 10/24/2023  Expected Discharge Date: per attending         Payor: BLUE CROSS [facility] / Plan: [facility] / Product Type: Commercial /     Extended Emergency Contact Information  Primary Emergency Contact: Marie Simmons  Mobile Phone: [phone]  Relation: Mother   needed? No    Discharge Plan A: Home with family       No Pharmacies Listed    Initial Assessment (most recent)       Adult Discharge Assessment - 10/24/23 1304          Discharge Assessment    Assessment Type Discharge Planning Assessment     Confirmed/corrected address, phone number and insurance Yes     Confirmed Demographics Correct on Facesheet     Source of Information patient     When was your last doctors appointment? --   [facility]    Communicated VENKAT with patient/caregiver Date not available/Unable to determine     Reason For Admission cellulitus of foot     People in Home child(raven), dependent     Do you expect to return to your current living situation? Yes     Do you have help at home or someone to help you manage your care at home? No     Prior to hospitilization cognitive status: Alert/Oriented     Current cognitive status: Alert/Oriented     Equipment Currently Used at Home none     Readmission within 30 days? No     Patient currently being followed by outpatient case management? No     Do you currently have service(s) that help you manage your care at home? No     Do you take prescription medications? Yes     Do you have prescription coverage? Yes     Coverage bcbs ms     Do you have any problems affording any of your prescribed medications? No     Who is going to help you get home at discharge? relative     How do you get to doctors appointments? car, drives self     Are you on dialysis? No     Do you  take coumadin? No     Discharge Plan A Home with family

## 2023-10-24 NOTE — CONSULTS
[facility]  Nephrology  Consult Note    Patient Name: Ian Albrecht  MRN: [id number]  Admission Date: 10/24/2023  Hospital Length of Stay: 0 days  Attending Provider: Tenzin Grayson MD   Primary Care Physician: Maria Guadalupe Bui  Principal Problem:Infected blister of left foot  Reason for Consult: JOSE  No Primary Nephrologist     Inpatient consult to Nephrology  Consult performed by: Hussein Sky MD  Consult ordered by: Tenzin Grayson MD  Reason for consult: Acute Kidney Injury        Subjective:     HPI: 37 yo female with unknown baseline creatinine (no available previous labs) transferred from [facility] for podiatry evaluation of Diabetic foot wound. She denies any known kidney disease told to her previously, last outpatient labs were May 2023. She recalls last year being placed on potassium supplements.     Kidney function is noted to be abnormal on transfer labs as well as next day labs. Yesterday sCr was 4.2mg/dL (per transfer note) and today 4.4mg/dL. Notable is a serum bicarb of 134 and potassium of 6.1    Recently she has been feeling fine, no n/v/d. Her diet has significantly changed in the past several months to include increased salads and vegetables with a much improved BS, recent Hba1c was 6.2. Her BS last year was in 400's.     Of note she has used BC powders almost daily p to TID past few months       Past Medical History:   Diagnosis Date    CKD (chronic kidney disease)     DM (diabetes mellitus), type 2     HTN (hypertension)     Obesity, unspecified        History reviewed. No pertinent surgical history.    Review of patient's allergies indicates:  No Known Allergies  Current Facility-Administered Medications   Medication Frequency    0.9%  NaCl infusion (for blood administration) Q24H PRN    0.9%  NaCl infusion Continuous    amLODIPine tablet 5 mg Daily    ceFEPIme (MAXIPIME) 2 g in dextrose 5 % in water (D5W) 100 mL IVPB (MB+) Q24H    glucagon (human recombinant) injection 1  mg PRN    hydrALAZINE injection 10 mg Q6H PRN    HYDROcodone-acetaminophen 7.5-325 mg per tablet 1 tablet Q6H PRN    insulin aspart U-100 pen 0-5 Units Q6H PRN    metroNIDAZOLE tablet 500 mg Q8H    vancomycin - pharmacy to dose pharmacy to manage frequency    vancomycin 1.75 g in 5 % dextrose 500 mL IVPB Once     Family History    None       Tobacco Use    Smoking status: Not on file    Smokeless tobacco: Not on file   Substance and Sexual Activity    Alcohol use: Not on file    Drug use: Not on file    Sexual activity: Not on file     Review of Systems   Constitutional:  Negative for activity change, appetite change and fatigue.   HENT:  Negative for facial swelling.    Respiratory:  Negative for shortness of breath.    Cardiovascular:  Positive for leg swelling (if misses a few days of HCTZ, le can get puffy).   Gastrointestinal:  Negative for abdominal pain, constipation, diarrhea, nausea and vomiting.   Genitourinary:  Negative for decreased urine volume, difficulty urinating, dysuria, flank pain and hematuria.   Musculoskeletal:  Positive for arthralgias.   Allergic/Immunologic: Positive for immunocompromised state.   Neurological:  Negative for weakness and light-headedness.   Psychiatric/Behavioral:  Negative for confusion and decreased concentration.      Objective:     Vital Signs (Most Recent):  Temp: 97.9 °F (36.6 °C) (10/24/23 1212)  Pulse: 109 (10/24/23 1258)  Resp: 18 (10/24/23 1212)  BP: (!) 179/96 (10/24/23 1258)  SpO2: 100 % (10/24/23 1212) Vital Signs (24h Range):  Temp:  [97.8 °F (36.6 °C)-97.9 °F (36.6 °C)] 97.9 °F (36.6 °C)  Pulse:  [102-109] 109  Resp:  [17-18] 18  SpO2:  [100 %] 100 %  BP: (147-188)/() 179/96     Weight: 89.8 kg (198 lb) (10/24/23 1212)  Body mass index is 36.21 kg/m².  Body surface area is 1.98 meters squared.        Physical Exam  Vitals and nursing note reviewed.   Constitutional:       General: She is not in acute distress.     Appearance: She is obese. She is not  ill-appearing.   HENT:      Head: Atraumatic.   Eyes:      General: No scleral icterus.  Cardiovascular:      Rate and Rhythm: Tachycardia present.   Pulmonary:      Breath sounds: No wheezing or rales.   Abdominal:      Palpations: Abdomen is soft.   Musculoskeletal:      Right lower leg: No edema.      Left lower leg: No edema.   Skin:     Findings: Lesion present.   Neurological:      Mental Status: She is alert and oriented to person, place, and time.   Psychiatric:         Mood and Affect: Mood normal.         Significant Labs: reviewed        Assessment/Plan:     Active Diagnoses:    Diagnosis Date Noted POA    PRINCIPAL PROBLEM:  Infected blister of left foot [S90.822A, L08.9] 10/24/2023 Yes    Cellulitis of left foot [L03.116] 10/24/2023 Yes    Type 2 diabetes mellitus with diabetic neuropathy, without long-term current use of insulin [E11.40] 10/24/2023 Yes    Severe obesity [E66.01] 10/24/2023 Yes    JOSE (acute kidney injury) [N17.9] 10/24/2023 Yes    Anemia [D64.9] 10/24/2023 Yes    Hyperkalemia [E87.5] 10/24/2023 Yes      Problems Resolved During this Admission:    Diagnosis Date Noted Date Resolved POA    Acute renal failure [N17.9] 10/24/2023 10/24/2023 Yes       JOSE on CKD versus JOSE  - unknown baseline creatinine  - NSAIDs clearly contributing but unknown if underlying CKD  - will work up abnormal kidney function with urine tests and renal u/s  - avoid nephrotoxins as able, keep hemodynamically stable  - agree with holding home ace-inhibitor for now     Hyperkalemia  - continue IVFs until potassium improves  - s/p Lokelma, recheck level this afternoon     Anemia  - to have blood transfusion    Thank you for your consult. I will follow-up with patient. Please contact us if you have any additional questions.    Hussein Sky MD  Nephrology

## 2023-10-25 PROBLEM — E87.20 METABOLIC ACIDOSIS: Status: ACTIVE | Noted: 2023-10-25

## 2023-10-25 LAB
ANION GAP SERPL CALC-SCNC: 10 MMOL/L (ref 8–16)
BASOPHILS # BLD AUTO: 0.05 K/UL (ref 0–0.2)
BASOPHILS NFR BLD: 0.4 % (ref 0–1.9)
BUN SERPL-MCNC: 58 MG/DL (ref 6–20)
CALCIUM SERPL-MCNC: 7 MG/DL (ref 8.7–10.5)
CHLORIDE SERPL-SCNC: 111 MMOL/L (ref 95–110)
CO2 SERPL-SCNC: 14 MMOL/L (ref 23–29)
CREAT SERPL-MCNC: 4.2 MG/DL (ref 0.5–1.4)
DIFFERENTIAL METHOD: ABNORMAL
EOSINOPHIL # BLD AUTO: 0.2 K/UL (ref 0–0.5)
EOSINOPHIL NFR BLD: 2 % (ref 0–8)
ERYTHROCYTE [DISTWIDTH] IN BLOOD BY AUTOMATED COUNT: 13.2 % (ref 11.5–14.5)
EST. GFR  (NO RACE VARIABLE): 13 ML/MIN/1.73 M^2
GLUCOSE SERPL-MCNC: 137 MG/DL (ref 70–110)
HCT VFR BLD AUTO: 22.9 % (ref 37–48.5)
HGB BLD-MCNC: 7.2 G/DL (ref 12–16)
IMM GRANULOCYTES # BLD AUTO: 0.06 K/UL (ref 0–0.04)
IMM GRANULOCYTES NFR BLD AUTO: 0.5 % (ref 0–0.5)
LYMPHOCYTES # BLD AUTO: 2 K/UL (ref 1–4.8)
LYMPHOCYTES NFR BLD: 17.7 % (ref 18–48)
MCH RBC QN AUTO: 27.4 PG (ref 27–31)
MCHC RBC AUTO-ENTMCNC: 31.4 G/DL (ref 32–36)
MCV RBC AUTO: 87 FL (ref 82–98)
MONOCYTES # BLD AUTO: 0.8 K/UL (ref 0.3–1)
MONOCYTES NFR BLD: 6.8 % (ref 4–15)
NEUTROPHILS # BLD AUTO: 8.3 K/UL (ref 1.8–7.7)
NEUTROPHILS NFR BLD: 72.6 % (ref 38–73)
NRBC BLD-RTO: 0 /100 WBC
PLATELET # BLD AUTO: 198 K/UL (ref 150–450)
PMV BLD AUTO: 8.8 FL (ref 9.2–12.9)
POCT GLUCOSE: 118 MG/DL (ref 70–110)
POCT GLUCOSE: 145 MG/DL (ref 70–110)
POCT GLUCOSE: 156 MG/DL (ref 70–110)
POCT GLUCOSE: 182 MG/DL (ref 70–110)
POTASSIUM SERPL-SCNC: 4.7 MMOL/L (ref 3.5–5.1)
RBC # BLD AUTO: 2.63 M/UL (ref 4–5.4)
SODIUM SERPL-SCNC: 135 MMOL/L (ref 136–145)
WBC # BLD AUTO: 11.4 K/UL (ref 3.9–12.7)

## 2023-10-25 PROCEDURE — 99232 PR SUBSEQUENT HOSPITAL CARE,LEVL II: ICD-10-PCS | Mod: ,,, | Performed by: PODIATRIST

## 2023-10-25 PROCEDURE — 25000003 PHARM REV CODE 250: Performed by: FAMILY MEDICINE

## 2023-10-25 PROCEDURE — 99232 SBSQ HOSP IP/OBS MODERATE 35: CPT | Mod: ,,, | Performed by: PODIATRIST

## 2023-10-25 PROCEDURE — 85025 COMPLETE CBC W/AUTO DIFF WBC: CPT | Performed by: FAMILY MEDICINE

## 2023-10-25 PROCEDURE — 99232 SBSQ HOSP IP/OBS MODERATE 35: CPT | Mod: ,,, | Performed by: INTERNAL MEDICINE

## 2023-10-25 PROCEDURE — 11000001 HC ACUTE MED/SURG PRIVATE ROOM

## 2023-10-25 PROCEDURE — 80048 BASIC METABOLIC PNL TOTAL CA: CPT | Performed by: FAMILY MEDICINE

## 2023-10-25 PROCEDURE — 99232 PR SUBSEQUENT HOSPITAL CARE,LEVL II: ICD-10-PCS | Mod: ,,, | Performed by: INTERNAL MEDICINE

## 2023-10-25 PROCEDURE — 63600175 PHARM REV CODE 636 W HCPCS: Performed by: FAMILY MEDICINE

## 2023-10-25 PROCEDURE — 27000207 HC ISOLATION

## 2023-10-25 PROCEDURE — A4217 STERILE WATER/SALINE, 500 ML: HCPCS | Performed by: FAMILY MEDICINE

## 2023-10-25 PROCEDURE — 36415 COLL VENOUS BLD VENIPUNCTURE: CPT | Performed by: FAMILY MEDICINE

## 2023-10-25 RX ORDER — LANOLIN ALCOHOL/MO/W.PET/CERES
1 CREAM (GRAM) TOPICAL DAILY
Status: DISCONTINUED | OUTPATIENT
Start: 2023-10-25 | End: 2023-10-26 | Stop reason: HOSPADM

## 2023-10-25 RX ORDER — ACETAMINOPHEN 325 MG/1
650 TABLET ORAL EVERY 6 HOURS PRN
Status: DISCONTINUED | OUTPATIENT
Start: 2023-10-25 | End: 2023-10-26 | Stop reason: HOSPADM

## 2023-10-25 RX ADMIN — FERROUS SULFATE TAB 325 MG (65 MG ELEMENTAL FE) 1 EACH: 325 (65 FE) TAB at 09:10

## 2023-10-25 RX ADMIN — ACETAMINOPHEN 650 MG: 325 TABLET ORAL at 09:10

## 2023-10-25 RX ADMIN — METOPROLOL TARTRATE 50 MG: 50 TABLET, FILM COATED ORAL at 08:10

## 2023-10-25 RX ADMIN — METRONIDAZOLE 500 MG: 500 TABLET ORAL at 05:10

## 2023-10-25 RX ADMIN — CEFEPIME 2 G: 2 INJECTION, POWDER, FOR SOLUTION INTRAVENOUS at 09:10

## 2023-10-25 RX ADMIN — AMLODIPINE BESYLATE 5 MG: 5 TABLET ORAL at 09:10

## 2023-10-25 RX ADMIN — SODIUM BICARBONATE: 84 INJECTION, SOLUTION INTRAVENOUS at 02:10

## 2023-10-25 RX ADMIN — HYDRALAZINE HYDROCHLORIDE 10 MG: 20 INJECTION, SOLUTION INTRAMUSCULAR; INTRAVENOUS at 09:10

## 2023-10-25 RX ADMIN — METOPROLOL TARTRATE 50 MG: 50 TABLET, FILM COATED ORAL at 09:10

## 2023-10-25 RX ADMIN — HYDRALAZINE HYDROCHLORIDE 10 MG: 20 INJECTION, SOLUTION INTRAMUSCULAR; INTRAVENOUS at 07:10

## 2023-10-25 NOTE — ASSESSMENT & PLAN NOTE
Continue antibiotics for the left lower extremity per Hospital Medicine  Follow cultures and sensitivities.  Strep agalactiae-pending    MRI negative for abscess formation

## 2023-10-25 NOTE — PROGRESS NOTES
[facility]  Podiatry  Progress Note    Patient Name: Ian Albrecht  MRN: [id number]  Admission Date: 10/24/2023  Hospital Length of Stay: 1 days  Attending Physician: eTnzin Grayson MD  Primary Care Provider: Maria Guadalupe Bui     Subjective:     Interval History:  Doing well.  States controlled pain.  Ambulating without significant discomfort or pain.  Tolerating antibiotics well.    Scheduled Meds:   amLODIPine  5 mg Oral Daily    ceFEPime (MAXIPIME) IVPB  2 g Intravenous Q24H    ferrous sulfate  1 tablet Oral Daily    metoprolol tartrate  50 mg Oral BID     Continuous Infusions:   sodium bicarbonate 150 mEq in sterile water 1,150 mL infusion 75 mL/hr at 10/25/23 1432     PRN Meds:0.9%  NaCl infusion (for blood administration), acetaminophen, glucagon (human recombinant), hydrALAZINE, HYDROcodone-acetaminophen, insulin aspart U-100    Review of Systems  Objective:     Vital Signs (Most Recent):  Temp: 98.2 °F (36.8 °C) (10/25/23 1152)  Pulse: 94 (10/25/23 1152)  Resp: 18 (10/25/23 1152)  BP: (!) 145/74 (10/25/23 1152)  SpO2: 100 % (10/25/23 1152) Vital Signs (24h Range):  Temp:  [97.8 °F (36.6 °C)-98.9 °F (37.2 °C)] 98.2 °F (36.8 °C)  Pulse:  [] 94  Resp:  [18-20] 18  SpO2:  [97 %-100 %] 100 %  BP: (127-182)/() 145/74     Weight: 89.8 kg (198 lb)  Body mass index is 36.21 kg/m².    Foot Exam    VASCULAR:  The right dorsalis pedis pulse 2/4 and the right posterior tibial pulse 2/4.  The left dorsalis pedis pulse 2/4 and posterior tibial pulse on the left is 2/4.  Capillary refill is intact.  Pedal hair growth intact    NEUROLOGICAL:  Protective sensation is intact with [place] Gutierrez monofilament. Proprioception is intact. Intact sensation to light touch.  Vibratory sensation is diminished to the 1st metatarsal phalangeal joint.     DERMATOLOGICAL:  Blister formation and cellulitis to left foot appear to be resolving    MUSCULOSKELETAL:  No gross or structural anatomic deformity  "present to the left foot.  Intrinsic and extrinsic musculature is intact.    Laboratory:  A1C: No results for input(s): "HGBA1C" in the last 4320 hours.  BMP:   Recent Labs   Lab 10/25/23  0559   *   *   K 4.7   *   CO2 14*   BUN 58*   CREATININE 4.2*   CALCIUM 7.0*     CBC:   Recent Labs   Lab 10/25/23  0559   WBC 11.40   RBC 2.63*   HGB 7.2*   HCT 22.9*      MCV 87   MCH 27.4   MCHC 31.4*     CMP:   Recent Labs   Lab 10/24/23  0834 10/24/23  1511 10/25/23  0559   *   < > 137*   CALCIUM 7.5*   < > 7.0*   ALBUMIN 2.5*  --   --    PROT 7.2  --   --       < > 135*   K 6.2*   < > 4.7   CO2 13*   < > 14*   *   < > 111*   BUN 54*   < > 58*   CREATININE 4.2*   < > 4.2*   ALKPHOS 114  --   --    ALT 12  --   --    AST 12  --   --    BILITOT 0.2  --   --     < > = values in this interval not displayed.     CRP:   Recent Labs   Lab 10/24/23  0834   .7*     ESR:   Recent Labs   Lab 10/24/23  0834   SEDRATE 142*     Wound Cultures:   Recent Labs   Lab 10/24/23  1025   LABAERO STREPTOCOCCUS AGALACTIAE (GROUP B)  Many  Beta-hemolytic streptococci are routinely susceptible to   penicillins,cephalosporins and carbapenems.  Susceptibility testing not routinely performed  *     All pertinent labs reviewed within the last 24 hours.    Diagnostic Results:  I have reviewed all pertinent imaging results/findings within the past 24 hours.    Clinical Findings:  Cellulitis of the left foot with previous blister formation.    Assessment/Plan:     Renal/  JOSE (acute kidney injury)  GFR 13.  No baseline currently.  Remained stable    ID  Cellulitis of foot  Continue antibiotics for the left lower extremity per Hospital Medicine  Follow cultures and sensitivities.  Strep agalactiae-pending    MRI negative for abscess formation    Endocrine  Type 2 diabetes mellitus with diabetic neuropathy, without long-term current use of insulin  Discussed the importance of appropriate diabetic " management and glycemic control.    Orthopedic  * Infected blister of left foot  MRI was negative for abscess.  No need for formal incision drainage in operating room suite.  Patient would likely transition to oral antibiotics    Will need local wound care upon discharge.  Betadine moist gauze, Renard, secure place.    Ambulate as tolerated in shoes that do not cause rubbing or friction to the digits which likely contributed to the cellulitis and infected blister      Patient okay to DC when cellulitis resolved.  Transition to oral antibiotics based on cultures and sensitivities  Local wound care on discharge.    Rozina Welch DPM  Podiatry  [facility]

## 2023-10-25 NOTE — PLAN OF CARE
Discussed with MRI this morning. MRI not scheduled until later this afternoon. Will return diet today and will review MRI prior to planning surgery if needed. WBC trending down with IV antibiotics. Follow C&S.

## 2023-10-25 NOTE — ASSESSMENT & PLAN NOTE
Anemia noted  Pt reports menorrhagia which has stopped  Will transfuse 1 unit prbc   TAJ noted  Will start ferrous sulfate

## 2023-10-25 NOTE — ASSESSMENT & PLAN NOTE
MRI was negative for abscess.  No need for formal incision drainage in operating room suite.  Patient would likely transition to oral antibiotics    Will need local wound care upon discharge.  Betadine moist gauze, Renard, secure place.    Ambulate as tolerated in shoes that do not cause rubbing or friction to the digits which likely contributed to the cellulitis and infected blister

## 2023-10-25 NOTE — ASSESSMENT & PLAN NOTE
Will cont vanc, cefepime, and flagyl  Podiatry consulted  Mri ordered to be performed today  Diet resumed  NPO at midnight

## 2023-10-25 NOTE — PROGRESS NOTES
[facility] Medicine  Progress Note    Patient Name: Ian Albrecht  MRN: [id number]  Patient Class: IP- Inpatient   Admission Date: 10/24/2023  Length of Stay: 1 days  Attending Physician: Tenzin Grayson MD  Primary Care Provider: Maria Guadalupe Bui        Subjective:     Principal Problem:Infected blister of left foot        HPI:  Patient is a 38 year old aa female with a PMH of DM, hypertension, and hyperlipidemia who presented to [facility] for wound on left foot. She reports having a blister formed on her foot a few days ago which progressively worsened. She reports pain however denies any fever. Does report drainage from wound. Xray performed at outlying facility negative for subcutaneous gas however there was high concern for gangrene. Patient was transferred to [facility] for podiatry services.           Overview/Hospital Course:  No notes on file    Interval History: No issues reported overnight. Does complain of headache this am.     Review of Systems   Constitutional:  Positive for fatigue. Negative for fever.   HENT:  Negative for sinus pressure.    Eyes:  Negative for visual disturbance.   Respiratory:  Negative for shortness of breath.    Cardiovascular:  Negative for chest pain.   Gastrointestinal:  Negative for nausea and vomiting.   Genitourinary:  Negative for difficulty urinating.   Musculoskeletal:  Negative for back pain.   Skin:  Positive for wound (left foot). Negative for rash.   Neurological:  Negative for headaches.   Psychiatric/Behavioral:  Negative for confusion.    All other systems reviewed and are negative.    Objective:     Vital Signs (Most Recent):  Temp: 98.2 °F (36.8 °C) (10/25/23 1152)  Pulse: 94 (10/25/23 1152)  Resp: 18 (10/25/23 1152)  BP: (!) 145/74 (10/25/23 1152)  SpO2: 100 % (10/25/23 1152) Vital Signs (24h Range):  Temp:  [97.8 °F (36.6 °C)-98.9 °F (37.2 °C)] 98.2 °F (36.8 °C)  Pulse:  [] 94  Resp:  [18-20] 18  SpO2:  [97 %-100 %]  100 %  BP: (127-190)/() 145/74     Weight: 89.8 kg (198 lb)  Body mass index is 36.21 kg/m².    Intake/Output Summary (Last 24 hours) at 10/25/2023 1159  Last data filed at 10/24/2023 2125  Gross per 24 hour   Intake 1407.73 ml   Output 200 ml   Net 1207.73 ml         Physical Exam  Constitutional:       General: She is not in acute distress.     Appearance: She is well-developed. She is not diaphoretic.   HENT:      Head: Normocephalic and atraumatic.   Eyes:      Pupils: Pupils are equal, round, and reactive to light.   Cardiovascular:      Rate and Rhythm: Normal rate and regular rhythm.      Heart sounds: Normal heart sounds. No murmur heard.     No friction rub. No gallop.   Pulmonary:      Effort: Pulmonary effort is normal. No respiratory distress.      Breath sounds: Normal breath sounds. No stridor. No wheezing or rales.   Abdominal:      General: Bowel sounds are normal. There is no distension.      Palpations: Abdomen is soft. There is no mass.      Tenderness: There is no abdominal tenderness. There is no guarding.   Skin:     General: Skin is warm.      Findings: No erythema.      Comments: Blister noted on lateral left foot   Neurological:      Mental Status: She is alert and oriented to person, place, and time.             Significant Labs: All pertinent labs within the past 24 hours have been reviewed.    Significant Imaging: I have reviewed all pertinent imaging results/findings within the past 24 hours.        Assessment/Plan:      * Infected blister of left foot  Will cont vanc, cefepime, and flagyl  Podiatry consulted  Mri ordered to be performed today  Diet resumed  NPO at midnight      Metabolic acidosis  Likely due to jose  Will cont bicarb drip        Hyperkalemia  lokelma  IVF  Repeat bmp later today       Anemia  Anemia noted  Pt reports menorrhagia which has stopped  Will transfuse 1 unit prbc   TAJ noted  Will start ferrous sulfate      JOSE (acute kidney injury)  Uncertain on  baseline creatinine  Check urine lytes  Consult nephrology on case  IVF     Type 2 diabetes mellitus with diabetic neuropathy, without long-term current use of insulin  Sliding scale  Ada diet         VTE Risk Mitigation (From admission, onward)    None          Discharge Planning   VENKAT:      Code Status: Full Code   Is the patient medically ready for discharge?:     Reason for patient still in hospital (select all that apply): Patient trending condition  Discharge Plan A: Home with family                  Tenzin Grayson MD  Department of Hospital Medicine   [facility]

## 2023-10-25 NOTE — SUBJECTIVE & OBJECTIVE
Interval History: No issues reported overnight. Does complain of headache this am.     Review of Systems   Constitutional:  Positive for fatigue. Negative for fever.   HENT:  Negative for sinus pressure.    Eyes:  Negative for visual disturbance.   Respiratory:  Negative for shortness of breath.    Cardiovascular:  Negative for chest pain.   Gastrointestinal:  Negative for nausea and vomiting.   Genitourinary:  Negative for difficulty urinating.   Musculoskeletal:  Negative for back pain.   Skin:  Positive for wound (left foot). Negative for rash.   Neurological:  Negative for headaches.   Psychiatric/Behavioral:  Negative for confusion.    All other systems reviewed and are negative.    Objective:     Vital Signs (Most Recent):  Temp: 98.2 °F (36.8 °C) (10/25/23 1152)  Pulse: 94 (10/25/23 1152)  Resp: 18 (10/25/23 1152)  BP: (!) 145/74 (10/25/23 1152)  SpO2: 100 % (10/25/23 1152) Vital Signs (24h Range):  Temp:  [97.8 °F (36.6 °C)-98.9 °F (37.2 °C)] 98.2 °F (36.8 °C)  Pulse:  [] 94  Resp:  [18-20] 18  SpO2:  [97 %-100 %] 100 %  BP: (127-190)/() 145/74     Weight: 89.8 kg (198 lb)  Body mass index is 36.21 kg/m².    Intake/Output Summary (Last 24 hours) at 10/25/2023 1159  Last data filed at 10/24/2023 2125  Gross per 24 hour   Intake 1407.73 ml   Output 200 ml   Net 1207.73 ml         Physical Exam  Constitutional:       General: She is not in acute distress.     Appearance: She is well-developed. She is not diaphoretic.   HENT:      Head: Normocephalic and atraumatic.   Eyes:      Pupils: Pupils are equal, round, and reactive to light.   Cardiovascular:      Rate and Rhythm: Normal rate and regular rhythm.      Heart sounds: Normal heart sounds. No murmur heard.     No friction rub. No gallop.   Pulmonary:      Effort: Pulmonary effort is normal. No respiratory distress.      Breath sounds: Normal breath sounds. No stridor. No wheezing or rales.   Abdominal:      General: Bowel sounds are normal. There  is no distension.      Palpations: Abdomen is soft. There is no mass.      Tenderness: There is no abdominal tenderness. There is no guarding.   Skin:     General: Skin is warm.      Findings: No erythema.      Comments: Blister noted on lateral left foot   Neurological:      Mental Status: She is alert and oriented to person, place, and time.             Significant Labs: All pertinent labs within the past 24 hours have been reviewed.    Significant Imaging: I have reviewed all pertinent imaging results/findings within the past 24 hours.

## 2023-10-25 NOTE — PLAN OF CARE
Patient is in stable condition, no acute distress, remained free from injuries, receiving IV fluids, receiving oral antibiotics, received one unit of blood, no pain reported this shift, on cardiac monitoring (ST), blood glucose checks performed, NPO after midnight for upcoming procedure, VSS, and all active orders reviewed. 24 hr chart check performed.

## 2023-10-25 NOTE — PROGRESS NOTES
[facility]  Nephrology  Progress Note    Patient Name: Ian Albrecht  MRN: [id number]  Admission Date: 10/24/2023  Hospital Length of Stay: 1 days  Attending Provider: Tenzin Grayson MD   Primary Care Physician: Maria Guadalupe Bui  Principal Problem:Infected blister of left foot  Reason for Consult: JOSE  No Primary Nephrologist       Subjective:     HPI: 37 yo female with unknown baseline creatinine (no available previous labs) transferred from [facility] ER for podiatry evaluation of Diabetic foot wound. She denies any known kidney disease told to her previously, last outpatient labs were May 2023. She recalls last year being placed on potassium supplements.   Kidney function is noted to be abnormal on transfer labs as well as next day labs. Yesterday sCr was 4.2mg/dL (per transfer note) and today 4.4mg/dL. Notable is a serum bicarb of 134 and potassium of 6.1  Recently she has been feeling fine, no n/v/d. Her diet has significantly changed in the past several months to include increased salads and vegetables with a much improved BS, recent Hba1c was 6.2. Her BS last year was in 400's.   Of note she has used BC powders almost daily p to TID past few months     10/25  - still no urine results  - feels well  - MRI without abscess        Review of patient's allergies indicates:  No Known Allergies  Current Facility-Administered Medications   Medication Frequency    0.9%  NaCl infusion (for blood administration) Q24H PRN    acetaminophen tablet 650 mg Q6H PRN    amLODIPine tablet 5 mg Daily    ceFEPIme (MAXIPIME) 2 g in dextrose 5 % in water (D5W) 100 mL IVPB (MB+) Q24H    ferrous sulfate tablet 1 each Daily    glucagon (human recombinant) injection 1 mg PRN    hydrALAZINE injection 10 mg Q6H PRN    HYDROcodone-acetaminophen 7.5-325 mg per tablet 1 tablet Q6H PRN    insulin aspart U-100 pen 0-5 Units Q6H PRN    metoprolol tartrate (LOPRESSOR) tablet 50 mg BID    sodium bicarbonate 150 mEq in sterile water  1,150 mL infusion Continuous             Objective:     Vital Signs (Most Recent):  Temp: 98.2 °F (36.8 °C) (10/25/23 1152)  Pulse: 94 (10/25/23 1152)  Resp: 18 (10/25/23 1152)  BP: (!) 145/74 (10/25/23 1152)  SpO2: 100 % (10/25/23 1152) Vital Signs (24h Range):  Temp:  [97.8 °F (36.6 °C)-98.9 °F (37.2 °C)] 98.2 °F (36.8 °C)  Pulse:  [] 94  Resp:  [18-20] 18  SpO2:  [97 %-100 %] 100 %  BP: (127-182)/() 145/74     Weight: 89.8 kg (198 lb) (10/24/23 1212)  Body mass index is 36.21 kg/m².  Body surface area is 1.98 meters squared.        Physical Exam  Vitals and nursing note reviewed.   Constitutional:       General: She is not in acute distress.     Appearance: She is obese. She is not ill-appearing.   HENT:      Head: Atraumatic.   Eyes:      General: No scleral icterus.  Cardiovascular:      Rate and Rhythm: Tachycardia present.   Pulmonary:      Effort: Pulmonary effort is normal. No respiratory distress.   Abdominal:      General: There is distension (obesity).   Musculoskeletal:      Right lower leg: No edema.      Left lower leg: No edema.   Skin:     Findings: Lesion present.   Neurological:      Mental Status: She is alert and oriented to person, place, and time.   Psychiatric:         Mood and Affect: Mood normal.         Significant Labs: reviewed        Assessment/Plan:     Active Diagnoses:    Diagnosis Date Noted POA    PRINCIPAL PROBLEM:  Infected blister of left foot [S90.822A, L08.9] 10/24/2023 Yes    Metabolic acidosis [E87.20] 10/25/2023 Yes    Cellulitis of foot [L03.119] 10/24/2023 Yes    Type 2 diabetes mellitus with diabetic neuropathy, without long-term current use of insulin [E11.40] 10/24/2023 Yes    Severe obesity [E66.01] 10/24/2023 Yes    JOSE (acute kidney injury) [N17.9] 10/24/2023 Yes    Anemia [D64.9] 10/24/2023 Yes    Hyperkalemia [E87.5] 10/24/2023 Yes    Hypertension [I10] 10/24/2023 Yes    NSAID long-term use [Z79.1] 10/24/2023 Not Applicable    Type 2 diabetes mellitus  with foot ulcer, without long-term current use of insulin [E11.621, L97.509] 10/24/2023 Yes    Obesity [E66.9] 10/24/2023 Yes      Problems Resolved During this Admission:    Diagnosis Date Noted Date Resolved POA    Acute renal failure [N17.9] 10/24/2023 10/24/2023 Yes       JOSE on CKD versus JOSE  - unknown baseline creatinine  - NSAIDs clearly contributing but unknown if underlying CKD  - will work up abnormal kidney function with urine tests and renal u/s  - avoid nephrotoxins as able, keep hemodynamically stable  - agree with holding home ace-inhibitor for now   Hyperkalemia  - continue IVFs until potassium improves  - s/p Lokelma, recheck level this afternoon   Anemia  - to have blood transfusion    10/25  - stable kidney function   - renal u/s without stigmata of CKD  - suspect her JOSE is from NSAIDs, no urine results to help discern if NSAID induced vasoconstriction with ATN OR Acute Interstitial Nephritis from NSAIDs  - check labs in am, if stable can d/c home with labs early next week and results to me with clinic visit to follow         Hussein Sky MD  Nephrology

## 2023-10-25 NOTE — SUBJECTIVE & OBJECTIVE
"  Subjective:     Interval History:  Doing well.  States controlled pain.  Ambulating without significant discomfort or pain.  Tolerating antibiotics well.    Scheduled Meds:   amLODIPine  5 mg Oral Daily    ceFEPime (MAXIPIME) IVPB  2 g Intravenous Q24H    ferrous sulfate  1 tablet Oral Daily    metoprolol tartrate  50 mg Oral BID     Continuous Infusions:   sodium bicarbonate 150 mEq in sterile water 1,150 mL infusion 75 mL/hr at 10/25/23 1432     PRN Meds:0.9%  NaCl infusion (for blood administration), acetaminophen, glucagon (human recombinant), hydrALAZINE, HYDROcodone-acetaminophen, insulin aspart U-100    Review of Systems  Objective:     Vital Signs (Most Recent):  Temp: 98.2 °F (36.8 °C) (10/25/23 1152)  Pulse: 94 (10/25/23 1152)  Resp: 18 (10/25/23 1152)  BP: (!) 145/74 (10/25/23 1152)  SpO2: 100 % (10/25/23 1152) Vital Signs (24h Range):  Temp:  [97.8 °F (36.6 °C)-98.9 °F (37.2 °C)] 98.2 °F (36.8 °C)  Pulse:  [] 94  Resp:  [18-20] 18  SpO2:  [97 %-100 %] 100 %  BP: (127-182)/() 145/74     Weight: 89.8 kg (198 lb)  Body mass index is 36.21 kg/m².    Foot Exam    VASCULAR:  The right dorsalis pedis pulse 2/4 and the right posterior tibial pulse 2/4.  The left dorsalis pedis pulse 2/4 and posterior tibial pulse on the left is 2/4.  Capillary refill is intact.  Pedal hair growth intact    NEUROLOGICAL:  Protective sensation is intact with Columbus Gutierrez monofilament. Proprioception is intact. Intact sensation to light touch.  Vibratory sensation is diminished to the 1st metatarsal phalangeal joint.     DERMATOLOGICAL:  Blister formation and cellulitis to left foot appear to be resolving    MUSCULOSKELETAL:  No gross or structural anatomic deformity present to the left foot.  Intrinsic and extrinsic musculature is intact.    Laboratory:  A1C: No results for input(s): "HGBA1C" in the last 4320 hours.  BMP:   Recent Labs   Lab 10/25/23  0559   *   *   K 4.7   *   CO2 14*   BUN " 58*   CREATININE 4.2*   CALCIUM 7.0*     CBC:   Recent Labs   Lab 10/25/23  0559   WBC 11.40   RBC 2.63*   HGB 7.2*   HCT 22.9*      MCV 87   MCH 27.4   MCHC 31.4*     CMP:   Recent Labs   Lab 10/24/23  0834 10/24/23  1511 10/25/23  0559   *   < > 137*   CALCIUM 7.5*   < > 7.0*   ALBUMIN 2.5*  --   --    PROT 7.2  --   --       < > 135*   K 6.2*   < > 4.7   CO2 13*   < > 14*   *   < > 111*   BUN 54*   < > 58*   CREATININE 4.2*   < > 4.2*   ALKPHOS 114  --   --    ALT 12  --   --    AST 12  --   --    BILITOT 0.2  --   --     < > = values in this interval not displayed.     CRP:   Recent Labs   Lab 10/24/23  0834   .7*     ESR:   Recent Labs   Lab 10/24/23  0834   SEDRATE 142*     Wound Cultures:   Recent Labs   Lab 10/24/23  1025   LABAERO STREPTOCOCCUS AGALACTIAE (GROUP B)  Many  Beta-hemolytic streptococci are routinely susceptible to   penicillins,cephalosporins and carbapenems.  Susceptibility testing not routinely performed  *     All pertinent labs reviewed within the last 24 hours.    Diagnostic Results:  I have reviewed all pertinent imaging results/findings within the past 24 hours.    Clinical Findings:  Cellulitis of the left foot with previous blister formation.

## 2023-10-26 VITALS
BODY MASS INDEX: 36.44 KG/M2 | HEART RATE: 102 BPM | TEMPERATURE: 98 F | OXYGEN SATURATION: 98 % | DIASTOLIC BLOOD PRESSURE: 92 MMHG | SYSTOLIC BLOOD PRESSURE: 166 MMHG | WEIGHT: 198 LBS | RESPIRATION RATE: 18 BRPM | HEIGHT: 62 IN

## 2023-10-26 LAB
ANION GAP SERPL CALC-SCNC: 14 MMOL/L (ref 8–16)
BACTERIA #/AREA URNS HPF: NORMAL /HPF
BACTERIA SPEC AEROBE CULT: ABNORMAL
BASOPHILS # BLD AUTO: 0.05 K/UL (ref 0–0.2)
BASOPHILS NFR BLD: 0.4 % (ref 0–1.9)
BILIRUB UR QL STRIP: NEGATIVE
BUN SERPL-MCNC: 52 MG/DL (ref 6–20)
CALCIUM SERPL-MCNC: 7.3 MG/DL (ref 8.7–10.5)
CHLORIDE SERPL-SCNC: 108 MMOL/L (ref 95–110)
CLARITY UR: CLEAR
CO2 SERPL-SCNC: 17 MMOL/L (ref 23–29)
COLOR UR: COLORLESS
CREAT SERPL-MCNC: 4.2 MG/DL (ref 0.5–1.4)
CREAT UR-MCNC: 46.7 MG/DL (ref 15–325)
DIFFERENTIAL METHOD: ABNORMAL
EOSINOPHIL # BLD AUTO: 0.1 K/UL (ref 0–0.5)
EOSINOPHIL NFR BLD: 0.8 % (ref 0–8)
EOSINOPHIL URNS QL WRIGHT STN: NORMAL
ERYTHROCYTE [DISTWIDTH] IN BLOOD BY AUTOMATED COUNT: 12.8 % (ref 11.5–14.5)
EST. GFR  (NO RACE VARIABLE): 13 ML/MIN/1.73 M^2
GLUCOSE SERPL-MCNC: 158 MG/DL (ref 70–110)
GLUCOSE UR QL STRIP: ABNORMAL
HCT VFR BLD AUTO: 24.1 % (ref 37–48.5)
HGB BLD-MCNC: 7.6 G/DL (ref 12–16)
HGB UR QL STRIP: ABNORMAL
HYALINE CASTS #/AREA URNS LPF: NORMAL /LPF
IMM GRANULOCYTES # BLD AUTO: 0.06 K/UL (ref 0–0.04)
IMM GRANULOCYTES NFR BLD AUTO: 0.5 % (ref 0–0.5)
KETONES UR QL STRIP: NEGATIVE
LEUKOCYTE ESTERASE UR QL STRIP: NEGATIVE
LYMPHOCYTES # BLD AUTO: 1.6 K/UL (ref 1–4.8)
LYMPHOCYTES NFR BLD: 13.4 % (ref 18–48)
MCH RBC QN AUTO: 27 PG (ref 27–31)
MCHC RBC AUTO-ENTMCNC: 31.5 G/DL (ref 32–36)
MCV RBC AUTO: 86 FL (ref 82–98)
MICROSCOPIC COMMENT: NORMAL
MONOCYTES # BLD AUTO: 0.7 K/UL (ref 0.3–1)
MONOCYTES NFR BLD: 6 % (ref 4–15)
NEUTROPHILS # BLD AUTO: 9.3 K/UL (ref 1.8–7.7)
NEUTROPHILS NFR BLD: 78.9 % (ref 38–73)
NITRITE UR QL STRIP: NEGATIVE
NRBC BLD-RTO: 0 /100 WBC
PH UR STRIP: 7 [PH] (ref 5–8)
PLATELET # BLD AUTO: 249 K/UL (ref 150–450)
PMV BLD AUTO: 9.4 FL (ref 9.2–12.9)
POCT GLUCOSE: 159 MG/DL (ref 70–110)
POCT GLUCOSE: 171 MG/DL (ref 70–110)
POTASSIUM SERPL-SCNC: 4.3 MMOL/L (ref 3.5–5.1)
PROT UR QL STRIP: ABNORMAL
PROT UR-MCNC: 456 MG/DL (ref 0–15)
PROT/CREAT UR: 9.76 MG/G{CREAT} (ref 0–0.2)
RBC # BLD AUTO: 2.82 M/UL (ref 4–5.4)
RBC #/AREA URNS HPF: 4 /HPF (ref 0–4)
SODIUM SERPL-SCNC: 139 MMOL/L (ref 136–145)
SP GR UR STRIP: 1.01 (ref 1–1.03)
SQUAMOUS #/AREA URNS HPF: 2 /HPF
URN SPEC COLLECT METH UR: ABNORMAL
UROBILINOGEN UR STRIP-ACNC: NEGATIVE EU/DL
WBC # BLD AUTO: 11.76 K/UL (ref 3.9–12.7)
WBC #/AREA URNS HPF: 2 /HPF (ref 0–5)
WBC CLUMPS URNS QL MICRO: NORMAL

## 2023-10-26 PROCEDURE — 25000003 PHARM REV CODE 250: Performed by: FAMILY MEDICINE

## 2023-10-26 PROCEDURE — 99232 SBSQ HOSP IP/OBS MODERATE 35: CPT | Mod: ,,, | Performed by: INTERNAL MEDICINE

## 2023-10-26 PROCEDURE — 87205 SMEAR GRAM STAIN: CPT | Performed by: INTERNAL MEDICINE

## 2023-10-26 PROCEDURE — 36415 COLL VENOUS BLD VENIPUNCTURE: CPT | Performed by: FAMILY MEDICINE

## 2023-10-26 PROCEDURE — 99232 PR SUBSEQUENT HOSPITAL CARE,LEVL II: ICD-10-PCS | Mod: ,,, | Performed by: INTERNAL MEDICINE

## 2023-10-26 PROCEDURE — 82570 ASSAY OF URINE CREATININE: CPT | Performed by: INTERNAL MEDICINE

## 2023-10-26 PROCEDURE — 80048 BASIC METABOLIC PNL TOTAL CA: CPT | Performed by: FAMILY MEDICINE

## 2023-10-26 PROCEDURE — 63600175 PHARM REV CODE 636 W HCPCS: Performed by: FAMILY MEDICINE

## 2023-10-26 PROCEDURE — 81000 URINALYSIS NONAUTO W/SCOPE: CPT | Performed by: INTERNAL MEDICINE

## 2023-10-26 PROCEDURE — 85025 COMPLETE CBC W/AUTO DIFF WBC: CPT | Performed by: FAMILY MEDICINE

## 2023-10-26 RX ORDER — SODIUM BICARBONATE 650 MG/1
650 TABLET ORAL 3 TIMES DAILY
Qty: 9 TABLET | Refills: 0 | Status: SHIPPED | OUTPATIENT
Start: 2023-10-26 | End: 2023-10-29

## 2023-10-26 RX ORDER — CEPHALEXIN 500 MG/1
500 CAPSULE ORAL EVERY 12 HOURS
Qty: 20 CAPSULE | Refills: 0 | Status: SHIPPED | OUTPATIENT
Start: 2023-10-27 | End: 2023-11-06

## 2023-10-26 RX ORDER — FERROUS SULFATE 324(65)MG
324 TABLET, DELAYED RELEASE (ENTERIC COATED) ORAL DAILY
Qty: 30 TABLET | Refills: 0 | Status: SHIPPED | OUTPATIENT
Start: 2023-10-26 | End: 2023-11-25

## 2023-10-26 RX ORDER — AMLODIPINE BESYLATE 5 MG/1
5 TABLET ORAL DAILY
Qty: 30 TABLET | Refills: 11 | Status: SHIPPED | OUTPATIENT
Start: 2023-10-27 | End: 2024-10-26

## 2023-10-26 RX ORDER — HYDROCODONE BITARTRATE AND ACETAMINOPHEN 7.5; 325 MG/1; MG/1
1 TABLET ORAL EVERY 6 HOURS PRN
Qty: 15 TABLET | Refills: 0 | Status: SHIPPED | OUTPATIENT
Start: 2023-10-26

## 2023-10-26 RX ADMIN — AMLODIPINE BESYLATE 5 MG: 5 TABLET ORAL at 08:10

## 2023-10-26 RX ADMIN — METOPROLOL TARTRATE 50 MG: 50 TABLET, FILM COATED ORAL at 08:10

## 2023-10-26 RX ADMIN — HYDRALAZINE HYDROCHLORIDE 10 MG: 20 INJECTION, SOLUTION INTRAMUSCULAR; INTRAVENOUS at 06:10

## 2023-10-26 RX ADMIN — ACETAMINOPHEN 650 MG: 325 TABLET ORAL at 08:10

## 2023-10-26 RX ADMIN — CEFEPIME 2 G: 2 INJECTION, POWDER, FOR SOLUTION INTRAVENOUS at 10:10

## 2023-10-26 RX ADMIN — FERROUS SULFATE TAB 325 MG (65 MG ELEMENTAL FE) 1 EACH: 325 (65 FE) TAB at 08:10

## 2023-10-26 NOTE — PLAN OF CARE
Discussed poc with pt, pt verbalized understanding    Purposeful rounding every 2hours    VS wnl  Cardiac monitoring in use, pt is NSR, tele monitor # [id number]  Blood glucose monitoring   Fall precautions in place, remains injury free      IVFs  Accurate I&Os  Abx given as prescribed  Bed locked at lowest position  Call light within reach    Chart check complete  Will cont with POC  Patient is ready for discharged

## 2023-10-26 NOTE — DISCHARGE SUMMARY
[facility]  Hospital Medicine  Discharge Summary      Patient Name: Ian Albrecht  MRN: [id number]  [facility]: [id number]  Patient Class: IP- Inpatient  Admission Date: 10/24/2023  Hospital Length of Stay: 2 days  Discharge Date and Time: 10/26/2023  2:25 PM  Attending Physician: Maria C att. providers found   Discharging Provider: Tenzin Grayson MD  Primary Care Provider: Hao Provider    Primary Care Team: [facility] MEDICINE B    HPI:   Patient is a 38 year old aa female with a PMH of DM, hypertension, and hyperlipidemia who presented to [facility] for wound on left foot. She reports having a blister formed on her foot a few days ago which progressively worsened. She reports pain however denies any fever. Does report drainage from wound. Xray performed at outlying facility negative for subcutaneous gas however there was high concern for gangrene. Patient was transferred to [facility] for podiatry services.           * No surgery found *      Hospital Course:   Patient was admitted for blister on left foot. Podiatry consulted. Empiric abx started. MRI did not show osteo or abscess. No surgery needed. Wound culture grew group b strep. Abx de-escalated to po cephalexin. Of note pt was found to have creatinine of 4. She reported taking BC powder multiple times a day for several weeks. Nephrology consulted. Creatinine stable. She was discharged home with outpatient f/u with nephrology.        Goals of Care Treatment Preferences:  Code Status: Full Code      Consults:   Consults (From admission, onward)        Status Ordering Provider     Inpatient consult to Podiatry  Once        Provider:  Rozina Welch DPM    Completed KATHY, TENZIN     Inpatient consult to Nephrology  Once        Provider:  Hussein Sky MD    Completed KATHY, TENZIN          No new Assessment & Plan notes have been filed under this hospital service since the last note was generated.  Service: Hospital Medicine    Final  Active Diagnoses:    Diagnosis Date Noted POA    PRINCIPAL PROBLEM:  Infected blister of left foot [S90.822A, L08.9] 10/24/2023 Yes    Metabolic acidosis [E87.20] 10/25/2023 Yes    Cellulitis of foot [L03.119] 10/24/2023 Yes    Type 2 diabetes mellitus with diabetic neuropathy, without long-term current use of insulin [E11.40] 10/24/2023 Yes    Severe obesity [E66.01] 10/24/2023 Yes    JOSE (acute kidney injury) [N17.9] 10/24/2023 Yes    Anemia [D64.9] 10/24/2023 Yes    Hyperkalemia [E87.5] 10/24/2023 Yes    Hypertension [I10] 10/24/2023 Yes    NSAID long-term use [Z79.1] 10/24/2023 Not Applicable    Type 2 diabetes mellitus with foot ulcer, without long-term current use of insulin [E11.621, L97.509] 10/24/2023 Yes    Obesity [E66.9] 10/24/2023 Yes      Problems Resolved During this Admission:    Diagnosis Date Noted Date Resolved POA    Acute renal failure [N17.9] 10/24/2023 10/24/2023 Yes       Discharged Condition: good    Disposition: Home or Self Care    Follow Up:   Follow-up Information     Ernie Sky MD Follow up in 1 week(s).    Specialty: Nephrology  Contact information:  [id number] [place] [address] [phone]                       Patient Instructions:      Ambulatory referral/consult to Nephrology   Standing Status: Future   Referral Priority: Routine Referral Type: Consultation   Referral Reason: Specialty Services Required   Referred to Provider: ERNIE SKY Requested Specialty: Nephrology   Number of Visits Requested: 1       Significant Diagnostic Studies: Labs:   BMP:   Recent Labs   Lab 10/25/23  0559 10/26/23  0553   * 158*   * 139   K 4.7 4.3   * 108   CO2 14* 17*   BUN 58* 52*   CREATININE 4.2* 4.2*   CALCIUM 7.0* 7.3*    and CBC   Recent Labs   Lab 10/25/23  0559 10/26/23  0553   WBC 11.40 11.76   HGB 7.2* 7.6*   HCT 22.9* 24.1*    249       Pending Diagnostic Studies:     Procedure Component Value Units Date/Time    Tesha  Stain, Urine Random [[id number]] Collected: 10/26/23 1226    Order Status: Sent Lab Status: In process Updated: 10/26/23 1240    Specimen: Urine, Clean Catch          Medications:  Reconciled Home Medications:      Medication List      START taking these medications    amLODIPine 5 MG tablet  Commonly known as: NORVASC  Take 1 tablet (5 mg total) by mouth once daily.  Start taking on: October 27, 2023     cephALEXin 500 MG capsule  Commonly known as: KEFLEX  Take 1 capsule (500 mg total) by mouth every 12 (twelve) hours. for 10 days  Start taking on: October 27, 2023     ferrous sulfate 324 mg (65 mg iron) Tbec  Take 1 tablet (324 mg total) by mouth once daily.     HYDROcodone-acetaminophen 7.5-325 mg per tablet  Commonly known as: NORCO  Take 1 tablet by mouth every 6 (six) hours as needed.     sodium bicarbonate 650 MG tablet  Take 1 tablet (650 mg total) by mouth 3 (three) times daily. for 3 days            Indwelling Lines/Drains at time of discharge:   Lines/Drains/Airways     None                 Time spent on the discharge of patient: 37 minutes         Tenzin Grayson MD  Department of Hospital Medicine  [facility]

## 2023-10-26 NOTE — PROGRESS NOTES
[facility] Surg  Nephrology  Progress Note    Patient Name: Ian Albrecht  MRN: [id number]  Admission Date: 10/24/2023  Hospital Length of Stay: 2 days  Attending Provider: Tenzin Grayson MD   Primary Care Physician: Maria Guadalupe Bui  Principal Problem:Infected blister of left foot  Reason for Consult: JOSE  No Primary Nephrologist       Subjective:     HPI: 37 yo female with unknown baseline creatinine (no available previous labs) transferred from [facility] ER for podiatry evaluation of Diabetic foot wound. She denies any known kidney disease told to her previously, last outpatient labs were May 2023. She recalls last year being placed on potassium supplements.   Kidney function is noted to be abnormal on transfer labs as well as next day labs. Yesterday sCr was 4.2mg/dL (per transfer note) and today 4.4mg/dL. Notable is a serum bicarb of 134 and potassium of 6.1  Recently she has been feeling fine, no n/v/d. Her diet has significantly changed in the past several months to include increased salads and vegetables with a much improved BS, recent Hba1c was 6.2. Her BS last year was in 400's.   Of note she has used BC powders almost daily p to TID past few months     10/25  - still no urine results  - feels well  - MRI without abscess    10/26  -         Review of patient's allergies indicates:  No Known Allergies  Current Facility-Administered Medications   Medication Frequency    0.9%  NaCl infusion (for blood administration) Q24H PRN    acetaminophen tablet 650 mg Q6H PRN    amLODIPine tablet 5 mg Daily    ceFEPIme (MAXIPIME) 2 g in dextrose 5 % in water (D5W) 100 mL IVPB (MB+) Q24H    ferrous sulfate tablet 1 each Daily    glucagon (human recombinant) injection 1 mg PRN    hydrALAZINE injection 10 mg Q6H PRN    HYDROcodone-acetaminophen 7.5-325 mg per tablet 1 tablet Q6H PRN    insulin aspart U-100 pen 0-5 Units Q6H PRN    metoprolol tartrate (LOPRESSOR) tablet 50 mg BID             Objective:     Vital  Signs (Most Recent):  Temp: 98.3 °F (36.8 °C) (10/26/23 1152)  Pulse: 102 (10/26/23 1152)  Resp: 18 (10/26/23 1152)  BP: (!) 166/92 (10/26/23 1152)  SpO2: 98 % (10/26/23 1152) Vital Signs (24h Range):  Temp:  [98.3 °F (36.8 °C)-98.8 °F (37.1 °C)] 98.3 °F (36.8 °C)  Pulse:  [] 102  Resp:  [17-18] 18  SpO2:  [96 %-100 %] 98 %  BP: (147-191)/(67-98) 166/92     Weight: 89.8 kg (198 lb) (10/24/23 1212)  Body mass index is 36.21 kg/m².  Body surface area is 1.98 meters squared.        Physical Exam  Vitals and nursing note reviewed.   Constitutional:       General: She is not in acute distress.     Appearance: She is obese. She is not ill-appearing.   HENT:      Head: Atraumatic.   Eyes:      General: No scleral icterus.  Cardiovascular:      Rate and Rhythm: Tachycardia present.   Pulmonary:      Effort: Pulmonary effort is normal. No respiratory distress.   Abdominal:      General: There is distension (obesity).   Musculoskeletal:      Right lower leg: No edema.      Left lower leg: No edema.   Skin:     Findings: Lesion present.   Neurological:      Mental Status: She is alert and oriented to person, place, and time.   Psychiatric:         Mood and Affect: Mood normal.       Significant Labs: reviewed        Assessment/Plan:     Active Diagnoses:    Diagnosis Date Noted POA    PRINCIPAL PROBLEM:  Infected blister of left foot [S90.822A, L08.9] 10/24/2023 Yes    Metabolic acidosis [E87.20] 10/25/2023 Yes    Cellulitis of foot [L03.119] 10/24/2023 Yes    Type 2 diabetes mellitus with diabetic neuropathy, without long-term current use of insulin [E11.40] 10/24/2023 Yes    Severe obesity [E66.01] 10/24/2023 Yes    JOSE (acute kidney injury) [N17.9] 10/24/2023 Yes    Anemia [D64.9] 10/24/2023 Yes    Hyperkalemia [E87.5] 10/24/2023 Yes    Hypertension [I10] 10/24/2023 Yes    NSAID long-term use [Z79.1] 10/24/2023 Not Applicable    Type 2 diabetes mellitus with foot ulcer, without long-term current use of insulin  [E11.621, L97.509] 10/24/2023 Yes    Obesity [E66.9] 10/24/2023 Yes      Problems Resolved During this Admission:    Diagnosis Date Noted Date Resolved POA    Acute renal failure [N17.9] 10/24/2023 10/24/2023 Yes       JOSE on CKD versus JOSE  - unknown baseline creatinine  - NSAIDs clearly contributing but unknown if underlying CKD  - will work up abnormal kidney function with urine tests and renal u/s  - avoid nephrotoxins as able, keep hemodynamically stable  - agree with holding home ace-inhibitor for now   Hyperkalemia  - continue IVFs until potassium improves  - s/p Lokelma, recheck level this afternoon   Anemia  - to have blood transfusion    10/25  - stable kidney function   - renal u/s without stigmata of CKD  - suspect her JOSE is from NSAIDs, no urine results to help discern if NSAID induced vasoconstriction with ATN OR Acute Interstitial Nephritis from NSAIDs  - check labs in am, if stable can d/c home with labs early next week and results to me with clinic visit to follow     10/26  - labs stable   - labs in a few days and sent to me, with follow up in clinic     Hussein Sky MD  Nephrology

## 2023-10-26 NOTE — HOSPITAL COURSE
Patient was admitted for blister on left foot. Podiatry consulted. Empiric abx started. MRI did not show osteo or abscess. No surgery needed. Wound culture grew group b strep. Abx de-escalated to po cephalexin. Of note pt was found to have creatinine of 4. She reported taking BC powder multiple times a day for several weeks. Nephrology consulted. Creatinine stable. She was discharged home with outpatient f/u with nephrology.

## 2023-10-26 NOTE — CONSULTS
Patient seen for wound care teaching. Patient educated on dressing change ordered per Dr Welch. Unwrapped left foot wound. Cleaned left foot wound with betadine. Moist betadine gauze, cely and secure in place. Demonstrated dressing change and provided dressing supplies to patient. Patient verbalized understanding and states she is comfortable performing herself at home.

## 2023-10-26 NOTE — PLAN OF CARE
Patient is in stable condition, no acute distress, remained free from injuries, receiving IV fluids, no pain reported this shift, on cardiac monitoring (ST), blood glucose checks performed, NPO after midnight, BP managed with PRN, VSS, and all active orders reviewed. 24 hr chart check performed.

## 2023-10-26 NOTE — PLAN OF CARE
[facility] Med Surg  Discharge Final Note    Primary Care Provider: Maria Guadalupe Bui    Expected Discharge Date: 10/26/2023    Final Discharge Note (most recent)       Final Note - 10/26/23 0956          Final Note    Assessment Type Final Discharge Note     Anticipated Discharge Disposition Home or Self Care     Hospital Resources/Appts/Education Provided Appointments scheduled and added to AVS

## 2023-10-30 NOTE — PHYSICIAN QUERY
PT Name: Ian Albrecht  MR #: [id number]    DOCUMENTATION CLARIFICATION     CDS/: JUWAN Martinez, RN, CDS              Contact information:[email or web address]     This form is a permanent document in the medical record.     Query Date: October 30, 2023    By submitting this query, we are merely seeking further clarification of documentation. Please utilize your independent clinical judgment when addressing the question(s) below.    Supporting Clinical Findings Location in Medical Record    Infected blister left foot- Will cont vanc, cefepime, and flagyl     Presented to [facility] for wound on left foot. She reports having a blister formed on her foot a few days ago which progressively worsened     Type 2 diabetes mellitus with diabetic neuropathy, without long-term current use of insulin  Sliding scale  Ada diet     Cellulitis of left foot  Continue IV antibiotics for the left lower extremity     Presented to the emergency department at [facility] same the with worsening cellulitis, increased pain, and swelling to the left foot for the last 3 days.  Operative shoe lower she is developing a callus to the 5th toe to ultimately resulted in a blister formation that began to spread to the dorsal lateral aspect of the left foot. Blister formation 5 cm x 4cm present to the dorsal lateral left foot and left fifth digit     Dermatological:  blister formation to the dorsal aspect the left forefoot encompassing the dorsal aspect of the 5th digit.  The area does measure approximately 5 cm x 4 cm with sloughing tissue.  Cellulitis to the foot.  There is moderate edema present.  There is no visualized open wound progressing to the subcutaneous tissue.  No bone or tendinous exposure       Transferred from outlying ER for podiatry evaluation of Diabetic foot wound.    H&P, Dr. Grayson, 10/24                       Podiatry, Dr. Welch, 10/24                                   Neph,   Jose Daniel, 10/25    10/24 10/24 10/25 10/26   Glucose 145 (H) 233 (H) 137 (H) 158 (H)     Lab 10/24-10/26               Provider, please clarify if there is any clinical correlation between Type 2 Diabetes Mellitus  and Cellulitis of Left Foot.           Are the conditions:      [  x] Due to or associated with each other   [  ] Unrelated to each other   [  ] Other explanation (Please Specify): ______________   [  ] Clinically Undetermined                                                                               Please document in your progress notes daily for the duration of treatment until resolved and include in your discharge summary.

## 2024-01-29 PROBLEM — N17.9 AKI (ACUTE KIDNEY INJURY): Status: RESOLVED | Noted: 2023-10-24 | Resolved: 2024-01-29

## 2025-06-12 NOTE — CARE UPDATE
Detail Level: Detailed
Potassium normal. Stop IVFs.   
Detail Level: Simple
[Approximately ___ (Month)] : the LMP was approximately [unfilled] month(s) ago